# Patient Record
Sex: FEMALE | Race: OTHER | HISPANIC OR LATINO | URBAN - METROPOLITAN AREA
[De-identification: names, ages, dates, MRNs, and addresses within clinical notes are randomized per-mention and may not be internally consistent; named-entity substitution may affect disease eponyms.]

---

## 2018-05-29 ENCOUNTER — INPATIENT (INPATIENT)
Facility: HOSPITAL | Age: 58
LOS: 19 days | Discharge: HOME | DRG: 178 | End: 2018-06-18
Attending: HOSPITALIST | Admitting: INTERNAL MEDICINE
Payer: MEDICAID

## 2018-05-29 VITALS — WEIGHT: 130.07 LBS

## 2018-05-29 DIAGNOSIS — A15.9 RESPIRATORY TUBERCULOSIS UNSPECIFIED: ICD-10-CM

## 2018-05-29 DIAGNOSIS — Z98.890 OTHER SPECIFIED POSTPROCEDURAL STATES: Chronic | ICD-10-CM

## 2018-05-29 PROCEDURE — 71045 X-RAY EXAM CHEST 1 VIEW: CPT | Mod: 26

## 2018-05-29 PROCEDURE — 99285 EMERGENCY DEPT VISIT HI MDM: CPT

## 2018-05-29 PROCEDURE — 99223 1ST HOSP IP/OBS HIGH 75: CPT | Mod: GC

## 2018-05-29 RX ORDER — LORATADINE 10 MG/1
1 TABLET ORAL
Qty: 0 | Refills: 0 | COMMUNITY

## 2018-05-29 RX ORDER — OMEPRAZOLE 10 MG/1
1 CAPSULE, DELAYED RELEASE ORAL
Qty: 0 | Refills: 0 | COMMUNITY

## 2018-05-29 NOTE — ED PROVIDER NOTE - OBJECTIVE STATEMENT
A 57 year old female pt with a hx of HLD presents to the ED for r/o Tuberculosis. The pt is native to [facility] and has been dealing with a nonproductive cough for the past two years. Pt recently came to the [place] and went to the [facility] 1 week ago. She had a CXR and QuantiFeron gold test which came back positive. Pt denies any rceent fevers or weight loss. denies fever. denies HA or neck pain. no chest pain or sob. no abd pain. no n/v/d. no urinary f/u/d. no back pain. no motor or sensory deficits. denies illicit drug use. no recent travel. no rash. no other acute issues symptoms or concerns

## 2018-05-29 NOTE — ED ADULT TRIAGE NOTE - CHIEF COMPLAINT QUOTE
Patient sent from [facility] to R/O TB, patient reports SOB and cough, patient recently arrived from [facility].

## 2018-05-29 NOTE — H&P ADULT - HISTORY OF PRESENT ILLNESS
Pt is 56 yo F with PMH of HLD and previous alcohol abuse for about 30 yrs ( quit 1 yr ago ). 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 Pt denies hx of smoking or second had smoking, but she does report hx of wood use for cooking for about 40 yrs. Recently pt went to [facility] clinic and her QuantiFeron test was positive , they ordered a cxr and report came back showing calcifications and possible granulomas in the RUL. Pt was sent to [facility] for evaluation and tx. Recently she was prescribe a PPI and Claritin but these medications did not help with her cough. Pt denies fever, chest pain , abdominal pain , weight loss, diarrhea, or sick contacts. Pt has been traveling back and forward to [facility]. Three of her family members tested positives with QuantiFeron test and they were already tx for a total of 9 months .

## 2018-05-29 NOTE — H&P ADULT - NSHPPHYSICALEXAM_GEN_ALL_CORE
Vital Signs Last 24 Hrs  T(C): 36.7 (29 May 2018 22:00), Max: 36.7 (29 May 2018 22:00)  T(F): 98.1 (29 May 2018 22:00), Max: 98.1 (29 May 2018 22:00)  HR: 66 (29 May 2018 22:00) (66 - 66)  BP: 144/79 (29 May 2018 22:00) (144/79 - 144/79)  RR: 16 (29 May 2018 22:00) (16 - 16)  SpO2: 96% (29 May 2018 22:00) (96% - 96%) Vital Signs Last 24 Hrs  T(C): 36.7 (29 May 2018 22:00), Max: 36.7 (29 May 2018 22:00)  T(F): 98.1 (29 May 2018 22:00), Max: 98.1 (29 May 2018 22:00)  HR: 66 (29 May 2018 22:00) (66 - 66)  BP: 144/79 (29 May 2018 22:00) (144/79 - 144/79)  RR: 16 (29 May 2018 22:00) (16 - 16)  SpO2: 96% (29 May 2018 22:00) (96% - 96%)    GENERAL: NAD, well-groomed, well-developed  HEAD:  Atraumatic, Normocephalic  EYES: EOMI, PERRLA, conjunctiva and sclera clear  ENMT: No tonsillar erythema, exudates, or enlargement; Moist mucous membranes, No lesions  NECK: Supple, No JVD, Normal thyroid  Respiratory: No rhonchi, wheezing, or rubs. Positive rales over right upper lobe   CV: Regular rate and rhythm; No murmurs, rubs, or gallops  Gastrointestinal: Soft, Nontender, Nondistended; Bowel sounds present  EXTREMITIES:  2+ Peripheral Pulses, No clubbing, cyanosis, or edema  LYMPH: No lymphadenopathy noted  NERVOUS SYSTEM:  Alert & Oriented X3, Good concentration; Motor Strength 5/5 B/L upper and lower extremities; . CN II-XII grossly intact   SKIN: No rashes or lesions

## 2018-05-29 NOTE — H&P ADULT - NSHPSOCIALHISTORY_GEN_ALL_CORE
Pt lives with her daughter and other family members, denies smoking or second hand smoking, no drugs, previous hx of alcohol use for about 30 yrs, she quit 1 yrs ago.

## 2018-05-29 NOTE — H&P ADULT - ASSESSMENT
Pt is 58 yo F with PMH of HLD and previous alcohol abuse for about 30 yrs ( quit 1 yr ago ) , sent from [facility] clinic due to chronic cough associated with sputum production , positive Quantiferon test and hx of TB exposure and prior CXR with calcifications/lesions over Right upper lobe.  Pt will be admitted due to possible active TB.     1-Tuberculosis  -admit to medicine service under Dr. Faustin   -vitals signs per routine , any bed  -Diet: dash  -Airborne precautions   -Repeat CXR  -sputum culture every day   -ID consult     2-HLD  -pt is no taken any medication at this time  -will order lipid panel     3-Preventive measure   Lovenox 40 mg /d Pt is 56 yo F with PMH of HLD and previous alcohol abuse for about 30 yrs ( quit 1 yr ago ) , sent from [facility] clinic due to chronic cough associated with sputum production , positive Quantiferon test and hx of TB exposure and prior CXR with calcifications/lesions over Right upper lobe.  Pt will be admitted due to high risk for  active TB.     1-Tuberculosis  -admit to medicine service under Dr. Faustin   -vitals signs per routine , any bed  -Diet: dash  -Airborne precautions   -Repeat CXR  -sputum culture every day   -ID consult   -GUNJAN aware of pt , will contact them in the am     2-HLD  -pt is no taken any medication at this time  -Lipid panel done last week, no need for med at this time , just life style modifications     3-Preventive measure   -DVT proph: ambulation and VCD boots as pt is low risk

## 2018-05-30 DIAGNOSIS — Z29.9 ENCOUNTER FOR PROPHYLACTIC MEASURES, UNSPECIFIED: ICD-10-CM

## 2018-05-30 DIAGNOSIS — E78.5 HYPERLIPIDEMIA, UNSPECIFIED: ICD-10-CM

## 2018-05-30 DIAGNOSIS — A15.9 RESPIRATORY TUBERCULOSIS UNSPECIFIED: ICD-10-CM

## 2018-05-30 LAB
ALBUMIN SERPL ELPH-MCNC: 4.4 G/DL — SIGNIFICANT CHANGE UP (ref 3.3–5.2)
ALP SERPL-CCNC: 125 U/L — HIGH (ref 40–120)
ALT FLD-CCNC: 17 U/L — SIGNIFICANT CHANGE UP
ANION GAP SERPL CALC-SCNC: 14 MMOL/L — SIGNIFICANT CHANGE UP (ref 5–17)
AST SERPL-CCNC: 18 U/L — SIGNIFICANT CHANGE UP
BASOPHILS # BLD AUTO: 0 K/UL — SIGNIFICANT CHANGE UP (ref 0–0.2)
BASOPHILS NFR BLD AUTO: 0.1 % — SIGNIFICANT CHANGE UP (ref 0–2)
BILIRUB SERPL-MCNC: 0.4 MG/DL — SIGNIFICANT CHANGE UP (ref 0.4–2)
BUN SERPL-MCNC: 18 MG/DL — SIGNIFICANT CHANGE UP (ref 8–20)
CALCIUM SERPL-MCNC: 9.2 MG/DL — SIGNIFICANT CHANGE UP (ref 8.6–10.2)
CHLORIDE SERPL-SCNC: 99 MMOL/L — SIGNIFICANT CHANGE UP (ref 98–107)
CO2 SERPL-SCNC: 25 MMOL/L — SIGNIFICANT CHANGE UP (ref 22–29)
CREAT SERPL-MCNC: 0.63 MG/DL — SIGNIFICANT CHANGE UP (ref 0.5–1.3)
EOSINOPHIL # BLD AUTO: 0 K/UL — SIGNIFICANT CHANGE UP (ref 0–0.5)
EOSINOPHIL NFR BLD AUTO: 0.6 % — SIGNIFICANT CHANGE UP (ref 0–6)
GLUCOSE SERPL-MCNC: 117 MG/DL — HIGH (ref 70–115)
HCT VFR BLD CALC: 40.6 % — SIGNIFICANT CHANGE UP (ref 37–47)
HGB BLD-MCNC: 12.8 G/DL — SIGNIFICANT CHANGE UP (ref 12–16)
LYMPHOCYTES # BLD AUTO: 1.7 K/UL — SIGNIFICANT CHANGE UP (ref 1–4.8)
LYMPHOCYTES # BLD AUTO: 20.9 % — SIGNIFICANT CHANGE UP (ref 20–55)
MCHC RBC-ENTMCNC: 28.3 PG — SIGNIFICANT CHANGE UP (ref 27–31)
MCHC RBC-ENTMCNC: 31.5 G/DL — LOW (ref 32–36)
MCV RBC AUTO: 89.6 FL — SIGNIFICANT CHANGE UP (ref 81–99)
MONOCYTES # BLD AUTO: 0.6 K/UL — SIGNIFICANT CHANGE UP (ref 0–0.8)
MONOCYTES NFR BLD AUTO: 6.8 % — SIGNIFICANT CHANGE UP (ref 3–10)
NEUTROPHILS # BLD AUTO: 5.6 K/UL — SIGNIFICANT CHANGE UP (ref 1.8–8)
NEUTROPHILS NFR BLD AUTO: 69.6 % — SIGNIFICANT CHANGE UP (ref 37–73)
PLATELET # BLD AUTO: 259 K/UL — SIGNIFICANT CHANGE UP (ref 150–400)
POTASSIUM SERPL-MCNC: 4.3 MMOL/L — SIGNIFICANT CHANGE UP (ref 3.5–5.3)
POTASSIUM SERPL-SCNC: 4.3 MMOL/L — SIGNIFICANT CHANGE UP (ref 3.5–5.3)
PROT SERPL-MCNC: 7.9 G/DL — SIGNIFICANT CHANGE UP (ref 6.6–8.7)
RBC # BLD: 4.53 M/UL — SIGNIFICANT CHANGE UP (ref 4.4–5.2)
RBC # FLD: 14.4 % — SIGNIFICANT CHANGE UP (ref 11–15.6)
SODIUM SERPL-SCNC: 138 MMOL/L — SIGNIFICANT CHANGE UP (ref 135–145)
WBC # BLD: 8.1 K/UL — SIGNIFICANT CHANGE UP (ref 4.8–10.8)
WBC # FLD AUTO: 8.1 K/UL — SIGNIFICANT CHANGE UP (ref 4.8–10.8)

## 2018-05-30 PROCEDURE — 99223 1ST HOSP IP/OBS HIGH 75: CPT

## 2018-05-30 PROCEDURE — 71250 CT THORAX DX C-: CPT | Mod: 26

## 2018-05-30 PROCEDURE — 99233 SBSQ HOSP IP/OBS HIGH 50: CPT | Mod: GC

## 2018-05-30 RX ORDER — ACETAMINOPHEN 500 MG
650 TABLET ORAL EVERY 6 HOURS
Qty: 0 | Refills: 0 | Status: DISCONTINUED | OUTPATIENT
Start: 2018-05-30 | End: 2018-06-18

## 2018-05-30 RX ORDER — ENOXAPARIN SODIUM 100 MG/ML
40 INJECTION SUBCUTANEOUS DAILY
Qty: 0 | Refills: 0 | Status: DISCONTINUED | OUTPATIENT
Start: 2018-05-30 | End: 2018-06-04

## 2018-05-30 RX ORDER — FLUTICASONE PROPIONATE 50 MCG
1 SPRAY, SUSPENSION NASAL
Qty: 0 | Refills: 0 | Status: DISCONTINUED | OUTPATIENT
Start: 2018-05-30 | End: 2018-06-18

## 2018-05-30 RX ORDER — ENOXAPARIN SODIUM 100 MG/ML
40 INJECTION SUBCUTANEOUS DAILY
Qty: 0 | Refills: 0 | Status: DISCONTINUED | OUTPATIENT
Start: 2018-05-30 | End: 2018-05-30

## 2018-05-30 RX ADMIN — Medication 1 SPRAY(S): at 17:47

## 2018-05-30 RX ADMIN — ENOXAPARIN SODIUM 40 MILLIGRAM(S): 100 INJECTION SUBCUTANEOUS at 14:18

## 2018-05-30 NOTE — CONSULT NOTE ADULT - ASSESSMENT
58 y/o woman with PMH of HLD and alcohol abuse in the past was admitted with positive QuantiFeron test, cough and sputum for about 2 years with some weight loss and night sweats.   She had contact with possible TB cases about 15-20 years ago.   TB is a possibility but sometimes old infection causing granulomas/scars in CXR is also possibility.     1-R/O TB:  -Airborne isolation  -CXR and chest CT  -Sputum AFB smear and culture x 3   -HIV test  -No empiric antibiotics or IRPE at this time.   -Based on CT and labs report will decide about need for bronchoscopy   -Meds for allergic rhinitis, possibly part of her symptoms  is related to allergy     Will follow up

## 2018-05-30 NOTE — PROGRESS NOTE ADULT - ATTENDING COMMENTS
Patient seen and examined at the bedside. Agree with the above history, physical, assessment, and plan with the necessary amendments/elaborations below:    clinically stable. does have report of many symptoms concerning for TB including night sweats, persistent productive cough. in conjunction with abn imaging + testing for TB, high suspicion for disease. sp collection of sputum x2 samples, pending 3rd. per id no meds until results confirming dx. cont iso.     also with complaints of HA, sinus pressure, nasal congestion, chronic, appearing allergic in nature. claritin, flonase, naproxen prn for symptoms. will follow.

## 2018-05-30 NOTE — PROGRESS NOTE ADULT - SUBJECTIVE AND OBJECTIVE BOX
Patient is a 57y old  Female who presents with a chief complaint of cough, positive QuantiFeron test and abnormal cxr (29 May 2018 22:08)    Patient seen and examined at bedside, No acute overnight events. Pt still report episodes of cough associated with sputum production   Denies fever, chest pain, SOB, abdominal pain, diarrhea, constipation, calf pain.  Patient ambulating, eating well, voiding and last BM yesterday       VS:   Vital Signs Last 24 Hrs  T(C): 36.7 (30 May 2018 05:55), Max: 36.7 (29 May 2018 22:00)  T(F): 98 (30 May 2018 05:55), Max: 98.1 (29 May 2018 22:00)  HR: 57 (30 May 2018 05:55) (57 - 66)  BP: 132/74 (30 May 2018 05:55) (114/72 - 144/79)  RR: 18 (30 May 2018 05:55) (16 - 18)  SpO2: 96% (30 May 2018 05:55) (94% - 97%)    Physical Exam:   GENERAL: NAD, well-groomed, well-developed  	HEAD:  Atraumatic, Normocephalic  	EYES: EOMI, PERRLA, conjunctiva and sclera clear  	ENMT: No tonsillar erythema, exudates, or enlargement; Moist mucous membranes, No lesions  	NECK: Supple, No JVD, Normal thyroid  	Respiratory: No rhonchi, wheezing, or rubs. Positive rales over right upper lobe   	CV: Regular rate and rhythm; No murmurs, rubs, or gallops  	Gastrointestinal: Soft, Nontender, Nondistended; Bowel sounds present  	EXTREMITIES:  2+ Peripheral Pulses, No clubbing, cyanosis, or edema  	LYMPH: No lymphadenopathy noted  	NERVOUS SYSTEM:  Alert & Oriented X3, Good concentration; Motor Strength 5/5 B/L upper and lower extremities; . CN II-XII grossly intact   SKIN: No rashes or lesions            Labs:                        12.8   8.1   )-----------( 259      ( 30 May 2018 00:45 )             40.6   05-30    138  |  99  |  18.0  ----------------------------<  117<H>  4.3   |  25.0  |  0.63    Ca    9.2      30 May 2018 00:45    TPro  7.9  /  Alb  4.4  /  TBili  0.4  /  DBili  x   /  AST  18  /  ALT  17  /  AlkPhos  125<H>  05-30        Radiology:  *Pull    Medications:  MEDICATIONS  (STANDING):  enoxaparin Injectable 40 milliGRAM(s) SubCutaneous daily    MEDICATIONS  (PRN):  acetaminophen   Tablet. 650 milliGRAM(s) Oral every 6 hours PRN Mild Pain (1 - 3)

## 2018-05-30 NOTE — CHART NOTE - NSCHARTNOTEFT_GEN_A_CORE
Spoke to Michela Dooley from [facility] ([phone]) regarding frequency of AFB smears. Patient is required to have 3 smear 8 hours apart, with one being an early morning specimen. Labs ordered appropriately.

## 2018-05-30 NOTE — ED ADULT NURSE NOTE - OBJECTIVE STATEMENT
As per pt family "shes had a cough for 1 year w/ SOB while walking, she came here from [facility] and went to [facility] they took blood and did a chest xray and said she might have tuberculosis, hx of high cholesterol and was a drinker stopped 1 year ago", pt AOx3, denies pain at this time, family @ bedside for translation, pt ambulates safely and steadily w/out assistance, denies sick contacts

## 2018-05-30 NOTE — PROGRESS NOTE ADULT - ASSESSMENT
Pt is 56 yo F with PMH of HLD and previous alcohol abuse for about 30 yrs ( quit 1 yr ago ) , sent from [facility] clinic due to chronic cough associated with sputum production , positive Quantiferon test and hx of TB exposure and prior CXR with calcifications/lesions over Right upper lobe.  Pt will be admitted due to high risk for  active TB.     1- Rule out Tuberculosis  -Airborne precautions   -Repeat CXR  -sputum culture every day   -ID consult   -GUNJAN aware of pt , will contact them in the am     2-HLD  -pt is no taken any medication at this time  -Lipid panel done last week, no need for med at this time , just life style modifications     3-Preventive measure   -DVT proph: ambulation and VCD boots as pt is low risk

## 2018-05-30 NOTE — ED ADULT NURSE REASSESSMENT NOTE - NS ED NURSE REASSESS COMMENT FT1
Report given to receiving RN Kay, awaiting transport to floor, family @ bedside, pt aware of plan of care. Report given to receiving RN Kay, awaiting transport to floor, family @ bedside, mask placed on pt, pt aware of plan of care.

## 2018-05-30 NOTE — ED ADULT NURSE NOTE - CHIEF COMPLAINT QUOTE
Patient sent from [facility] to R/O TB, patient reports SOB and cough, patient recently arrived from [facility].

## 2018-05-30 NOTE — CONSULT NOTE ADULT - SUBJECTIVE AND OBJECTIVE BOX
[facility]  INFECTIOUS DISEASES AND INTERNAL MEDICINE at [place]  =======================================================  Ha Callahan MD  Diplomates American Board of Internal Medicine and Infectious Diseases  =======================================================    [id number]  ERIC ZION     CC: cough, positive QuantiFeron test and abnormal chest Xray.     HPI:  Patient is a Stateless speaking woman, the daughter was in the room so she helped with interpretation( as per patient's request).     56 y/o woman with PMH of HLD and alcohol abuse in the past was referred by [facility] clinic for possible TB infection. She has cough with yellow/green sputum, night sweats and weight loss in last 2 years.    No new changes in her symptoms. Appetite is good, no fever.   She lives in [place] and vising her kids in [place] on [place] and planning to go back in 1-2 months. Lives with her  in a city if 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 Never had PPD test in the past.   She also has allergic rhinitis mostly to pollens.  Three of her family members tested positives with QuantiFeron test and they were already treated for 9motnhs.     PAST MEDICAL & SURGICAL HISTORY:  Hyperlipidemia  Alcohol abuse stopped one year ago  S/P hernia repair      Social Hx: No smoking or drugs but abused alcohol for more than 20 years and stopped one year ago.     FAMILY HISTORY:  No pertinent family history in first degree relatives      Allergies  No Known Allergies    Antibiotics:  None      REVIEW OF SYSTEMS:  CONSTITUTIONAL:  No Fever or chills, + weight loss   HEENT:  No diplopia or blurred vision.  No earache, sore throat, + Runny nose  CARDIOVASCULAR:  No chest pain or SOB.  RESPIRATORY:  + cough, No SOB, +sputum   GI:  No nausea, vomiting or diarrhea.  GENITOURINARY:  No dysuria, frequency or urgency. No Blood in urine  MUSCULOSKELETAL:  no joint aches, no muscle pain  SKIN:  No change in skin, hair or nails.  NEUROLOGIC:  No paresthesias, fasciculations, seizures or weakness.  PSYCHIATRIC:  No disorder of thought or mood.  ENDOCRINE:  No heat or cold intolerance, polyuria or polydipsia.  HEMATOLOGICAL:  No easy bruising or bleeding.     Physical Exam:  Vital Signs Last 24 Hrs  T(C): 36.7 (30 May 2018 05:55), Max: 36.7 (29 May 2018 22:00)  T(F): 98 (30 May 2018 05:55), Max: 98.1 (29 May 2018 22:00)  HR: 57 (30 May 2018 05:55) (57 - 66)  BP: 132/74 (30 May 2018 05:55) (114/72 - 144/79)  RR: 18 (30 May 2018 05:55) (16 - 18)  SpO2: 96% (30 May 2018 05:55) (94% - 97%)  Weight (kg): 59 (05-29 @ 19:59)  GEN: NAD  HEENT: normocephalic and atraumatic. EOMI. PERRL.    NECK: Supple.  No lymphadenopathy   LUNGS: scattered coarse rhonchi bilaterally  HEART: Regular rate and rhythm without murmur.  ABDOMEN: Soft, nontender, and nondistended.  Positive bowel sounds.    : No CVA tenderness  EXTREMITIES: Without any cyanosis, clubbing, rash, lesions or edema.   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  PSYCHIATRIC: Appropriate affect .  SKIN: No ulceration or induration present.    Labs:  05-30    138  |  99  |  18.0  ----------------------------<  117<H>  4.3   |  25.0  |  0.63    Ca    9.2      30 May 2018 00:45    TPro  7.9  /  Alb  4.4  /  TBili  0.4  /  DBili  x   /  AST  18  /  ALT  17  /  AlkPhos  125<H>  05-30                        12.8   8.1   )-----------( 259      ( 30 May 2018 00:45 )             40.6     LIVER FUNCTIONS - ( 30 May 2018 00:45 )  Alb: 4.4 g/dL / Pro: 7.9 g/dL / ALK PHOS: 125 U/L / ALT: 17 U/L / AST: 18 U/L / GGT: x           RECENT CULTURES:  Pending     All imaging and other data have been reviewed.  Pending

## 2018-05-31 LAB
ALBUMIN SERPL ELPH-MCNC: 4 G/DL — SIGNIFICANT CHANGE UP (ref 3.3–5.2)
ALP SERPL-CCNC: 118 U/L — SIGNIFICANT CHANGE UP (ref 40–120)
ALT FLD-CCNC: 16 U/L — SIGNIFICANT CHANGE UP
ANION GAP SERPL CALC-SCNC: 12 MMOL/L — SIGNIFICANT CHANGE UP (ref 5–17)
AST SERPL-CCNC: 15 U/L — SIGNIFICANT CHANGE UP
BASOPHILS # BLD AUTO: 0 K/UL — SIGNIFICANT CHANGE UP (ref 0–0.2)
BASOPHILS NFR BLD AUTO: 0.5 % — SIGNIFICANT CHANGE UP (ref 0–2)
BILIRUB SERPL-MCNC: 0.7 MG/DL — SIGNIFICANT CHANGE UP (ref 0.4–2)
BUN SERPL-MCNC: 24 MG/DL — HIGH (ref 8–20)
CALCIUM SERPL-MCNC: 9.3 MG/DL — SIGNIFICANT CHANGE UP (ref 8.6–10.2)
CHLORIDE SERPL-SCNC: 100 MMOL/L — SIGNIFICANT CHANGE UP (ref 98–107)
CO2 SERPL-SCNC: 28 MMOL/L — SIGNIFICANT CHANGE UP (ref 22–29)
CREAT SERPL-MCNC: 0.58 MG/DL — SIGNIFICANT CHANGE UP (ref 0.5–1.3)
EOSINOPHIL # BLD AUTO: 0.8 K/UL — HIGH (ref 0–0.5)
EOSINOPHIL NFR BLD AUTO: 13.2 % — HIGH (ref 0–6)
GLUCOSE SERPL-MCNC: 88 MG/DL — SIGNIFICANT CHANGE UP (ref 70–115)
HCT VFR BLD CALC: 40.4 % — SIGNIFICANT CHANGE UP (ref 37–47)
HGB BLD-MCNC: 12.5 G/DL — SIGNIFICANT CHANGE UP (ref 12–16)
HIV 1 & 2 AB SERPL IA.RAPID: SIGNIFICANT CHANGE UP
LYMPHOCYTES # BLD AUTO: 1.5 K/UL — SIGNIFICANT CHANGE UP (ref 1–4.8)
LYMPHOCYTES # BLD AUTO: 24.4 % — SIGNIFICANT CHANGE UP (ref 20–55)
MCHC RBC-ENTMCNC: 27.9 PG — SIGNIFICANT CHANGE UP (ref 27–31)
MCHC RBC-ENTMCNC: 30.9 G/DL — LOW (ref 32–36)
MCV RBC AUTO: 90.2 FL — SIGNIFICANT CHANGE UP (ref 81–99)
MONOCYTES # BLD AUTO: 0.6 K/UL — SIGNIFICANT CHANGE UP (ref 0–0.8)
MONOCYTES NFR BLD AUTO: 9.6 % — SIGNIFICANT CHANGE UP (ref 3–10)
NEUTROPHILS # BLD AUTO: 3.1 K/UL — SIGNIFICANT CHANGE UP (ref 1.8–8)
NEUTROPHILS NFR BLD AUTO: 50.5 % — SIGNIFICANT CHANGE UP (ref 37–73)
NIGHT BLUE STAIN TISS: SIGNIFICANT CHANGE UP
PLATELET # BLD AUTO: 227 K/UL — SIGNIFICANT CHANGE UP (ref 150–400)
POTASSIUM SERPL-MCNC: 4.2 MMOL/L — SIGNIFICANT CHANGE UP (ref 3.5–5.3)
POTASSIUM SERPL-SCNC: 4.2 MMOL/L — SIGNIFICANT CHANGE UP (ref 3.5–5.3)
PROT SERPL-MCNC: 7.3 G/DL — SIGNIFICANT CHANGE UP (ref 6.6–8.7)
RBC # BLD: 4.48 M/UL — SIGNIFICANT CHANGE UP (ref 4.4–5.2)
RBC # FLD: 14.5 % — SIGNIFICANT CHANGE UP (ref 11–15.6)
SODIUM SERPL-SCNC: 140 MMOL/L — SIGNIFICANT CHANGE UP (ref 135–145)
SPECIMEN SOURCE: SIGNIFICANT CHANGE UP
T PALLIDUM AB TITR SER: NEGATIVE — SIGNIFICANT CHANGE UP
WBC # BLD: 6.2 K/UL — SIGNIFICANT CHANGE UP (ref 4.8–10.8)
WBC # FLD AUTO: 6.2 K/UL — SIGNIFICANT CHANGE UP (ref 4.8–10.8)

## 2018-05-31 PROCEDURE — 99232 SBSQ HOSP IP/OBS MODERATE 35: CPT

## 2018-05-31 PROCEDURE — 99233 SBSQ HOSP IP/OBS HIGH 50: CPT | Mod: GC

## 2018-05-31 RX ORDER — ACETAMINOPHEN 500 MG
650 TABLET ORAL EVERY 6 HOURS
Qty: 0 | Refills: 0 | Status: DISCONTINUED | OUTPATIENT
Start: 2018-05-31 | End: 2018-05-31

## 2018-05-31 RX ADMIN — Medication 250 MILLIGRAM(S): at 09:13

## 2018-05-31 RX ADMIN — ENOXAPARIN SODIUM 40 MILLIGRAM(S): 100 INJECTION SUBCUTANEOUS at 13:04

## 2018-05-31 RX ADMIN — Medication 250 MILLIGRAM(S): at 10:15

## 2018-05-31 RX ADMIN — Medication 1 SPRAY(S): at 05:29

## 2018-05-31 RX ADMIN — Medication 1 SPRAY(S): at 18:07

## 2018-05-31 NOTE — PROGRESS NOTE ADULT - ASSESSMENT
Pt is 56 yo F with PMH of HLD and previous alcohol abuse for about 30 yrs ( quit 1 yr ago ) , sent from [facility] clinic due to chronic cough associated with sputum production , positive Quantiferon test and hx of TB exposure and prior CXR with calcifications/lesions over Right upper lobe.  Pt will be admitted due to high risk for  active TB.     1- Rule out Tuberculosis  -Airborne precautions   -CT: 1.  Multifocal scarring and multiple small calcifications in the right   lung signify prior granulomatous disease. No cavitation or consolidation.  2.  Tree in bud opacities in the periphery of the right lower lobe can be   seen   with endobronchial spread of tuberculosis;   -sputum culture x 3 collected  , waiting for result   -ID consulted noted   -GUNJAN aware of pt ,    2-HLD  -pt is no taken any medication at this time  -Lipid panel done last week, no need for med at this time , just life style modifications     3-Preventive measure   -DVT proph: Lovenox 40 mg daily

## 2018-05-31 NOTE — PROGRESS NOTE ADULT - ASSESSMENT
58 y/o woman with PMH of HLD and alcohol abuse in the past was admitted with positive QuantiFeron test, cough and sputum for about 2 years with some weight loss and night sweats.   She had contact with possible TB cases about 15-20 years ago.   TB is a possibility but sometimes old infection causing granulomas/scars as well.   HIV neg. CT suggestive of scars of old infection.     1-R/O TB:  -Airborne isolation  -Sputum AFB smear and culture x 3   -No empiric antibiotics or IRPE at this time.   -If 3 neg AFB sputa, recommend bronchoscopy and BAL to be sent for AFB smear, culture and PCR. Can start IRPE after bronch while awaiting the results.

## 2018-05-31 NOTE — PROGRESS NOTE ADULT - SUBJECTIVE AND OBJECTIVE BOX
[facility]  INFECTIOUS DISEASES AND INTERNAL MEDICINE at [place]  =======================================================  Ha Callahan MD  Diplomates American Board of Internal Medicine and Infectious Diseases  =======================================================    ERIC DONOVAN [id number]    Follow up:  56 y/o woman with PMH of HLD and alcohol abuse in the past was referred by [facility] clinic for possible TB infection. She has cough with yellow/green sputum, night sweats and weight loss in last 2 years.    She lives in [place] and vising her kids in [place]. [place] to [place] on [place]4 and planning to go back in 1-2 months. 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 Never had PPD test in the past.   Three of her family members tested positives with QuantiFeron test and they were already treated for 9motnhs.   No changes in her symptoms.     PAST MEDICAL & SURGICAL HISTORY:  Hyperlipidemia  Alcohol abuse stopped one year ago  S/P hernia repair    Social Hx: No smoking or drugs but abused alcohol for more than 20 years and stopped one year ago.     FAMILY HISTORY:  No pertinent family history in first degree relatives    Allergies  No Known Allergies    Antibiotics:  None      REVIEW OF SYSTEMS:  CONSTITUTIONAL:  No Fever or chills, + weight loss   HEENT:  No diplopia or blurred vision.  No earache, sore throat, + Runny nose  CARDIOVASCULAR:  No chest pain or SOB.  RESPIRATORY:  + cough, No SOB, +sputum   GI:  No nausea, vomiting or diarrhea.  GENITOURINARY:  No dysuria, frequency or urgency. No Blood in urine  MUSCULOSKELETAL:  no joint aches, no muscle pain  SKIN:  No change in skin, hair or nails.  NEUROLOGIC:  No paresthesias, fasciculations, seizures or weakness.  PSYCHIATRIC:  No disorder of thought or mood.  ENDOCRINE:  No heat or cold intolerance, polyuria or polydipsia.  HEMATOLOGICAL:  No easy bruising or bleeding.     Physical Exam:  Vital Signs Last 24 Hrs  T(C): 37.1 (31 May 2018 05:12), Max: 37.1 (31 May 2018 05:12)  T(F): 98.8 (31 May 2018 05:12), Max: 98.8 (31 May 2018 05:12)  HR: 70 (31 May 2018 05:12) (68 - 70)  BP: 121/76 (31 May 2018 05:12) (110/73 - 121/76)  RR: 18 (31 May 2018 05:12) (18 - 18)  GEN: NAD  HEENT: normocephalic and atraumatic. EOMI. PERRL.    NECK: Supple.  No lymphadenopathy   LUNGS: scattered coarse rhonchi bilaterally  HEART: Regular rate and rhythm without murmur.  ABDOMEN: Soft, nontender, and nondistended.  Positive bowel sounds.    : No CVA tenderness  EXTREMITIES: Without any cyanosis, clubbing, rash, lesions or edema.   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  PSYCHIATRIC: Appropriate affect .  SKIN: No ulceration or induration present.    Labs:  05-31    140  |  100  |  24.0<H>  ----------------------------<  88  4.2   |  28.0  |  0.58    Ca    9.3      31 May 2018 06:36    TPro  7.3  /  Alb  4.0  /  TBili  0.7  /  DBili  x   /  AST  15  /  ALT  16  /  AlkPhos  118  05-31                    12.5   6.2   )-----------( 227      ( 31 May 2018 06:36 )             40.4     LIVER FUNCTIONS - ( 31 May 2018 06:36 )  Alb: 4.0 g/dL / Pro: 7.3 g/dL / ALK PHOS: 118 U/L / ALT: 16 U/L / AST: 15 U/L / GGT: x           RECENT CULTURES:  Pending    All imaging and data are reviewed.     Chest CT:  IMPRESSION:       1.  Multifocal scarring and multiple small calcifications in the right lung   signify prior granulomatous disease. No cavitation or consolidation.  2.  Tree in bud opacities in the periphery of the right lower lobe can be   seen with endobronchial spread of tuberculosis; however, there no cavitary   lesions to more definitively indicate the presence of reactivation tuberculosis.   More commonly, tree and opacities will represent viral or bacterial infectious   bronchiolitis.

## 2018-05-31 NOTE — PROGRESS NOTE ADULT - ATTENDING COMMENTS
Patient seen and examined at the bedside. Agree with the above history, physical, assessment, and plan with the necessary amendments/elaborations below:    asymptomatic. afb pending. doing well. diffuse rhonchi noted, not likely tb related but believe pt to have underlying resp path, highest likelihood for asthma, allergic variant, given other symptoms congruent with allergies. no hx smoking, + occ 2nd hand smoke. cont supportive care. add duonebs prn as pt recieves much benefit from it on arrival. otherwise cont tb work up, id consult appreciated. needs pulm follow up on dc for spirometry/pfts despite outcome of this work up

## 2018-05-31 NOTE — PROGRESS NOTE ADULT - SUBJECTIVE AND OBJECTIVE BOX
Patient is a 57y old  Female who presents with a chief complaint of cough, positive QuantiFeron test and abnormal cxr (29 May 2018 22:08)    Patient seen and examined at bedside, No acute overnight events. Pt still report episodes of cough associated with sputum production   Denies fever, chest pain, SOB, abdominal pain, diarrhea, constipation, calf pain.  Patient ambulating, eating well, voiding and last BM yesterday       Vital Signs Last 24 Hrs  T(C): 37.1 (31 May 2018 05:12), Max: 37.1 (31 May 2018 05:12)  T(F): 98.8 (31 May 2018 05:12), Max: 98.8 (31 May 2018 05:12)  HR: 70 (31 May 2018 05:12) (68 - 70)  BP: 121/76 (31 May 2018 05:12) (110/73 - 121/76)  RR: 18 (31 May 2018 05:12) (18 - 18)      Physical Exam:   GENERAL: NAD, well-groomed, well-developed  	HEAD:  Atraumatic, Normocephalic  	EYES: EOMI, PERRLA, conjunctiva and sclera clear  	ENMT: No tonsillar erythema, exudates, or enlargement; Moist mucous membranes, No lesions  	NECK: Supple, No JVD, Normal thyroid  	Respiratory: No rhonchi, wheezing, or rubs. Positive rales over right upper lobe   	CV: Regular rate and rhythm; No murmurs, rubs, or gallops  	Gastrointestinal: Soft, Nontender, Nondistended; Bowel sounds present  	EXTREMITIES:  2+ Peripheral Pulses, No clubbing, cyanosis, or edema  	LYMPH: No lymphadenopathy noted  	NERVOUS SYSTEM:  Alert & Oriented X3, Good concentration; Motor Strength 5/5 B/L upper and lower extremities; . CN II-XII grossly intact   SKIN: No rashes or lesions            Labs:                        12.8   8.1   )-----------( 259      ( 30 May 2018 00:45 )             40.6   05-30    138  |  99  |  18.0  ----------------------------<  117<H>  4.3   |  25.0  |  0.63    Ca    9.2      30 May 2018 00:45    TPro  7.9  /  Alb  4.4  /  TBili  0.4  /  DBili  x   /  AST  18  /  ALT  17  /  AlkPhos  125<H>  05-30        Radiology:  < from: CT Chest No Cont (05.30.18 @ 20:04) >  IMPRESSION:       1.  Multifocal scarring and multiple small calcifications in the right   lung   signify prior granulomatous disease. No cavitation or consolidation.  2.  Tree in bud opacities in the periphery of the right lower lobe can be   seen   with endobronchial spread of tuberculosis; however, there no cavitary   lesions   to more definitively indicate the presence of reactivation tuberculosis.   More   commonly, tree and opacities will represent viral or bacterial infectious   bronchiolitis.    < end of copied text >      Medications:  MEDICATIONS  (STANDING):  enoxaparin Injectable 40 milliGRAM(s) SubCutaneous daily    MEDICATIONS  (PRN):  acetaminophen   Tablet. 650 milliGRAM(s) Oral every 6 hours PRN Mild Pain (1 - 3) Patient is a 57y old  Female who presents with a chief complaint of cough, positive QuantiFeron test and abnormal cxr (29 May 2018 22:08)    Patient seen and examined at bedside, No acute overnight events. Pt states that her cough is better , today she report some body aches   Denies fever, chest pain, SOB, abdominal pain, diarrhea, constipation, calf pain.  Patient ambulating, eating well, voiding and last BM yesterday       Vital Signs Last 24 Hrs  T(C): 37.1 (31 May 2018 05:12), Max: 37.1 (31 May 2018 05:12)  T(F): 98.8 (31 May 2018 05:12), Max: 98.8 (31 May 2018 05:12)  HR: 70 (31 May 2018 05:12) (68 - 70)  BP: 121/76 (31 May 2018 05:12) (110/73 - 121/76)  RR: 18 (31 May 2018 05:12) (18 - 18)      Physical Exam:   GENERAL: NAD, well-groomed, well-developed  	HEAD:  Atraumatic, Normocephalic  	EYES: EOMI, PERRLA, conjunctiva and sclera clear  	ENMT: No tonsillar erythema, exudates, or enlargement; Moist mucous membranes, No lesions  	NECK: Supple, No JVD, Normal thyroid  	Respiratory: No rhonchi, wheezing, or rubs. Positive rales over right upper lobe   	CV: Regular rate and rhythm; No murmurs, rubs, or gallops  	Gastrointestinal: Soft, Nontender, Nondistended; Bowel sounds present  	EXTREMITIES:  2+ Peripheral Pulses, No clubbing, cyanosis, or edema  	LYMPH: No lymphadenopathy noted  	NERVOUS SYSTEM:  Alert & Oriented X3, Good concentration; Motor Strength 5/5 B/L upper and lower extremities; . CN II-XII grossly intact   SKIN: No rashes or lesions            Labs:                        12.8   8.1   )-----------( 259      ( 30 May 2018 00:45 )             40.6   05-30    138  |  99  |  18.0  ----------------------------<  117<H>  4.3   |  25.0  |  0.63    Ca    9.2      30 May 2018 00:45    TPro  7.9  /  Alb  4.4  /  TBili  0.4  /  DBili  x   /  AST  18  /  ALT  17  /  AlkPhos  125<H>  05-30        Radiology:  < from: CT Chest No Cont (05.30.18 @ 20:04) >  IMPRESSION:       1.  Multifocal scarring and multiple small calcifications in the right   lung   signify prior granulomatous disease. No cavitation or consolidation.  2.  Tree in bud opacities in the periphery of the right lower lobe can be   seen   with endobronchial spread of tuberculosis; however, there no cavitary   lesions   to more definitively indicate the presence of reactivation tuberculosis.   More   commonly, tree and opacities will represent viral or bacterial infectious   bronchiolitis.    < end of copied text >      Medications:  MEDICATIONS  (STANDING):  enoxaparin Injectable 40 milliGRAM(s) SubCutaneous daily    MEDICATIONS  (PRN):  acetaminophen   Tablet. 650 milliGRAM(s) Oral every 6 hours PRN Mild Pain (1 - 3)

## 2018-06-01 LAB
ANION GAP SERPL CALC-SCNC: 11 MMOL/L — SIGNIFICANT CHANGE UP (ref 5–17)
BASOPHILS # BLD AUTO: 0 K/UL — SIGNIFICANT CHANGE UP (ref 0–0.2)
BASOPHILS NFR BLD AUTO: 0.6 % — SIGNIFICANT CHANGE UP (ref 0–2)
BUN SERPL-MCNC: 22 MG/DL — HIGH (ref 8–20)
CALCIUM SERPL-MCNC: 9.4 MG/DL — SIGNIFICANT CHANGE UP (ref 8.6–10.2)
CHLORIDE SERPL-SCNC: 100 MMOL/L — SIGNIFICANT CHANGE UP (ref 98–107)
CO2 SERPL-SCNC: 30 MMOL/L — HIGH (ref 22–29)
CREAT SERPL-MCNC: 0.63 MG/DL — SIGNIFICANT CHANGE UP (ref 0.5–1.3)
EOSINOPHIL # BLD AUTO: 0.9 K/UL — HIGH (ref 0–0.5)
EOSINOPHIL NFR BLD AUTO: 16.5 % — HIGH (ref 0–6)
GLUCOSE SERPL-MCNC: 87 MG/DL — SIGNIFICANT CHANGE UP (ref 70–115)
HCT VFR BLD CALC: 42.3 % — SIGNIFICANT CHANGE UP (ref 37–47)
HGB BLD-MCNC: 13.3 G/DL — SIGNIFICANT CHANGE UP (ref 12–16)
LYMPHOCYTES # BLD AUTO: 2 K/UL — SIGNIFICANT CHANGE UP (ref 1–4.8)
LYMPHOCYTES # BLD AUTO: 36.5 % — SIGNIFICANT CHANGE UP (ref 20–55)
MCHC RBC-ENTMCNC: 28.2 PG — SIGNIFICANT CHANGE UP (ref 27–31)
MCHC RBC-ENTMCNC: 31.4 G/DL — LOW (ref 32–36)
MCV RBC AUTO: 89.8 FL — SIGNIFICANT CHANGE UP (ref 81–99)
MONOCYTES # BLD AUTO: 0.4 K/UL — SIGNIFICANT CHANGE UP (ref 0–0.8)
MONOCYTES NFR BLD AUTO: 7.5 % — SIGNIFICANT CHANGE UP (ref 3–10)
NEUTROPHILS # BLD AUTO: 2 K/UL — SIGNIFICANT CHANGE UP (ref 1.8–8)
NEUTROPHILS NFR BLD AUTO: 37.6 % — SIGNIFICANT CHANGE UP (ref 37–73)
PLATELET # BLD AUTO: 230 K/UL — SIGNIFICANT CHANGE UP (ref 150–400)
POTASSIUM SERPL-MCNC: 4.5 MMOL/L — SIGNIFICANT CHANGE UP (ref 3.5–5.3)
POTASSIUM SERPL-SCNC: 4.5 MMOL/L — SIGNIFICANT CHANGE UP (ref 3.5–5.3)
RBC # BLD: 4.71 M/UL — SIGNIFICANT CHANGE UP (ref 4.4–5.2)
RBC # FLD: 14.3 % — SIGNIFICANT CHANGE UP (ref 11–15.6)
SODIUM SERPL-SCNC: 141 MMOL/L — SIGNIFICANT CHANGE UP (ref 135–145)
WBC # BLD: 5.4 K/UL — SIGNIFICANT CHANGE UP (ref 4.8–10.8)
WBC # FLD AUTO: 5.4 K/UL — SIGNIFICANT CHANGE UP (ref 4.8–10.8)

## 2018-06-01 PROCEDURE — 99221 1ST HOSP IP/OBS SF/LOW 40: CPT

## 2018-06-01 PROCEDURE — 99233 SBSQ HOSP IP/OBS HIGH 50: CPT | Mod: GC

## 2018-06-01 RX ORDER — IPRATROPIUM/ALBUTEROL SULFATE 18-103MCG
3 AEROSOL WITH ADAPTER (GRAM) INHALATION EVERY 6 HOURS
Qty: 0 | Refills: 0 | Status: DISCONTINUED | OUTPATIENT
Start: 2018-06-01 | End: 2018-06-18

## 2018-06-01 RX ADMIN — Medication 1 SPRAY(S): at 17:44

## 2018-06-01 RX ADMIN — ENOXAPARIN SODIUM 40 MILLIGRAM(S): 100 INJECTION SUBCUTANEOUS at 13:54

## 2018-06-01 RX ADMIN — Medication 1 SPRAY(S): at 05:51

## 2018-06-01 NOTE — PROGRESS NOTE ADULT - SUBJECTIVE AND OBJECTIVE BOX
CC: Patient is a 57y old  Female who presents with a chief complaint of cough, positive QuantiFeron test and abnormal cxr, admitted to rule out active tuberculosis (29 May 2018 22:08)    Patient seen and examined at bedside, No acute overnight events. Patient this AM complaining of mild b/l HA as well as leg pain b/l when she tries to ambulate. Patient ambulating, eating well, voiding and last BM yesterday.    ROS: Denies fever, chest pain, SOB, abdominal pain, diarrhea, constipation, isolated calf pain.    VS:   Vital Signs Last 24 Hrs  T(C): 36.7 (01 Jun 2018 05:16), Max: 36.9 (31 May 2018 12:00)  T(F): 98 (01 Jun 2018 05:16), Max: 98.5 (31 May 2018 12:00)  HR: 57 (01 Jun 2018 05:16) (57 - 72)  BP: 129/77 (01 Jun 2018 05:16) (122/60 - 140/70)  RR: 18 (01 Jun 2018 05:16) (18 - 20)  SpO2: 96% (31 May 2018 20:30) (95% - 96%)    Physical Exam:   Gen: NAD  HEENT: NCAT, EOMI, PERRLA, Pupils_  CVS: RRR, +S1/S2, no murmurs, rubs or gallops appreciated  Lungs: CTAB, no wheeze, rales, rhonchi  Abdomen: +BS, soft, ND, NT. no palpable flank tenderness or mass, no CVA tenderness  Ext: No cyanosis, edema or calf tenderness  Neuro: AAOx3, no focal deficits.    Labs:                        13.3   5.4   )-----------( 230      ( 01 Jun 2018 06:59 )             42.3   06-01    141  |  100  |  22.0<H>  ----------------------------<  87  4.5   |  30.0<H>  |  0.63    Ca    9.4      01 Jun 2018 06:59    TPro  7.3  /  Alb  4.0  /  TBili  0.7  /  DBili  x   /  AST  15  /  ALT  16  /  AlkPhos  118  05-31 05-31 @ 07:01  -  06-01 @ 07:00  --------------------------------------------------------  IN: 240 mL / OUT: 500 mL / NET: -260 mL        Radiology:  < from: CT Chest No Cont (05.30.18 @ 20:04) >  IMPRESSION:       1.  Multifocal scarring and multiple small calcifications in the right   lung   signify prior granulomatous disease. No cavitation or consolidation.  2.  Tree in bud opacities in the periphery of the right lower lobe can be   seen   with endobronchial spread of tuberculosis; however, there no cavitary   lesions   to more definitively indicate the presence of reactivation tuberculosis.   More   commonly, tree and opacities will represent viral or bacterial infectious   bronchiolitis.      Medications:  MEDICATIONS  (STANDING):  enoxaparin Injectable 40 milliGRAM(s) SubCutaneous daily  fluticasone propionate 50 MICROgram(s)/spray Nasal Spray 1 Spray(s) Both Nostrils two times a day    MEDICATIONS  (PRN):  acetaminophen   Tablet. 650 milliGRAM(s) Oral every 6 hours PRN Mild Pain (1 - 3)  naproxen 250 milliGRAM(s) Oral every 12 hours PRN Pain

## 2018-06-01 NOTE — PROGRESS NOTE ADULT - ATTENDING COMMENTS
Patient seen and examined at the bedside. Agree with the above history, physical, assessment, and plan with the necessary amendments/elaborations below:    clinically stable. aried minor complaints but many unrelating to effects of TB. some diffuse rhonchi noted today for first time on exam, per pt no hx of smoking but occ 2nd hand smoke exposure. also with no hx copd/asthma, given chronic cough, likely dose have underlying pulm disease separate from TB if present, pulm consult on discharge advised. additionally duoneb q6 prn for improvement, much symptomatic improvement with it on arrival in ED when similar resp findings noted on exam. suspect poss allergic asthma given many allergic complaints.    regarding tb w/u, pending afb cx, all 3 collected. id following. hold on abx until results confirming tb noted as pt asymptomatic. Patient seen and examined at the bedside. Agree with the above history, physical, assessment, and plan with the necessary amendments/elaborations below:    afb neg x3. per id given still high clinical suspicion, bronch + BAL to r/o TB definitively to be done. hold on abx until dx confirmed. CT sx consult placed, requested pulm cs in addition, also placed. will follow. likely to have bronch early next week, if neg dc home with pulm referral.     duonebs prn. will also start trial of protonix for chronic cough in case there is a component of gerd causing her symptoms

## 2018-06-01 NOTE — PROGRESS NOTE ADULT - ASSESSMENT
Pt is 58 yo F with PMH of HLD and previous alcohol abuse for about 30 yrs ( quit 1 yr ago ) , sent from [facility] clinic due to chronic cough associated with sputum production , positive Quantiferon test and hx of TB exposure and prior CXR with calcifications/lesions over Right upper lobe.  Pt will be admitted due to high risk for  active TB.     Rule out Tuberculosis  -Airborne precautions   -CT showing changes suggestive of previous granulomatous disease, no clear indication of active TB, tree in bud chnages in right lobe periphery that may represent bronhiolitis  -sputum culture x 3 collected  , no growth of AFB  -ID consulted noted - will contact pulm for bronchoscopy and BAL to be sent for AFB smear, culture and PCR  -GUNJAN aware of pt ,    HLD  -pt is no taken any medication at this time  -Lipid panel done last week, no need for med at this time , just life style modifications     Preventive measure   -DVT proph: Lovenox 40 mg daily

## 2018-06-01 NOTE — CONSULT NOTE ADULT - SUBJECTIVE AND OBJECTIVE BOX
PULMONARY CONSULT NOTE      CHELSEA DONOVAN-[id number]    Patient is a 57y old  Female who presents with a chief complaint of cough, positive QuantiFeron test and abnormal cxr (29 May 2018 22:08)      HISTORY OF PRESENT ILLNESS:  58 y/o woman with PMH of HLD and alcohol abuse in the past was referred by [facility] clinic for possible TB infection. She has cough with yellow/green sputum, night sweats and weight loss in last 2 years.    No new changes in her symptoms. Appetite is good, no fever.   She lives in [facility] and vising her kids in [place]. [place] to [place] on 5[place]4 and planning to go back in 1-2 months. 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 Never had PPD test in the past.   She also has allergic rhinitis mostly to pollens.  Three of her family members tested positives with QuantiFeron test and they were already treated for 9months.     AFB neg X 3 on sputum  Given abnormal CT, CTS called for bronchoscopy  CTS requested prior to writing their consult note      MEDICATIONS  (STANDING):  enoxaparin Injectable 40 milliGRAM(s) SubCutaneous daily  fluticasone propionate 50 MICROgram(s)/spray Nasal Spray 1 Spray(s) Both Nostrils two times a day      MEDICATIONS  (PRN):  acetaminophen   Tablet. 650 milliGRAM(s) Oral every 6 hours PRN Mild Pain (1 - 3)  ALBUTerol/ipratropium for Nebulization 3 milliLiter(s) Nebulizer every 6 hours PRN Shortness of Breath and/or Wheezing  naproxen 250 milliGRAM(s) Oral every 12 hours PRN Pain      Allergies    No Known Allergies    Intolerances        PAST MEDICAL & SURGICAL HISTORY:  Hyperlipidemia  Alcohol abuse  S/P hernia repair      FAMILY HISTORY:  No pertinent family history in first degree relatives      SOCIAL HISTORY  Smoking History:     REVIEW OF SYSTEMS:    CONSTITUTIONAL:   Sweats noted    HEENT:  Eyes:  No diplopia or blurred vision. ENT:  No earache, sore throat or runny nose.    CARDIOVASCULAR:  No pressure, squeezing, tightness, or heaviness about the chest; no palpitations.    RESPIRATORY:  No  shortness of breath, PND or orthopnea. Mild SOBOE    GASTROINTESTINAL:  No abdominal pain, nausea, vomiting or diarrhea.    GENITOURINARY:  No dysuria, frequency or urgency.    NEUROLOGIC:  No paresthesias, fasciculations, seizures or weakness.    PSYCHIATRIC:  No disorder of thought or mood.    Vital Signs Last 24 Hrs  T(C): 36.5 (01 Jun 2018 09:20), Max: 36.9 (31 May 2018 12:00)  T(F): 97.7 (01 Jun 2018 09:20), Max: 98.5 (31 May 2018 12:00)  HR: 70 (01 Jun 2018 09:20) (57 - 72)  BP: 118/80 (01 Jun 2018 09:20) (118/80 - 140/70)  BP(mean): --  RR: 14 (01 Jun 2018 09:20) (14 - 20)  SpO2: 98% (01 Jun 2018 09:20) (95% - 98%)    PHYSICAL EXAMINATION:    GENERAL: The patient is a well-developed, well-nourished _____in no apparent distress.     HEENT: Head is normocephalic and atraumatic. Extraocular muscles are intact. Mucous membranes are moist.     NECK: Supple.     LUNGS: Scattered rhonchi to auscultation. Respirations unlabored    HEART: Regular rate and rhythm without murmur.    ABDOMEN: Soft, nontender, and nondistended.  No hepatosplenomegaly is noted.    EXTREMITIES: Without any cyanosis, clubbing, rash, lesions or edema.    NEUROLOGIC: Grossly intact.      LABS:                        13.3   5.4   )-----------( 230      ( 01 Jun 2018 06:59 )             42.3     06-01    141  |  100  |  22.0<H>  ----------------------------<  87  4.5   |  30.0<H>  |  0.63    Ca    9.4      01 Jun 2018 06:59    TPro  7.3  /  Alb  4.0  /  TBili  0.7  /  DBili  x   /  AST  15  /  ALT  16  /  AlkPhos  118  05-31    HIV neg    MICROBIOLOGY: No positive findings noted    RADIOLOGY & ADDITIONAL STUDIES:    CT Chest on 5/30/18  IMPRESSION:       1.  Multifocal scarring and multiple small calcifications in the right   lung   signify prior granulomatous disease. No cavitation or consolidation.  2.  Tree in bud opacities in the periphery of the right lower lobe can be   seen   with endobronchial spread of tuberculosis; however, there no cavitary   lesions   to more definitively indicate the presence of reactivation tuberculosis.   More   commonly, tree and opacities will represent viral or bacterial infectious   bronchiolitis.    NINO MCGRAW M.D., ATTENDING RADIOLOGIST  This document has been electronically signed. May 31 2018  6:53AM

## 2018-06-01 NOTE — CONSULT NOTE ADULT - ASSESSMENT
56 y/o F with a h/o HLD , ETOH and  TB presented to ED with cough, night sweats and weight loss , currently visiting from [facility] being worked up here to rule out active TB.  Consulted for possible bronch BAL

## 2018-06-01 NOTE — CONSULT NOTE ADULT - ASSESSMENT
Assess    At least latent TB  Can't exclude, though doubt active TB  T-I-B infiltrates suggest bronchiolitis      Rec    Bronchoscopy by thoracic if advised by ID per GUNJAN guidelines  This is a public health issue and [facility] guidelines determine HARMON and Rx  We do not perform bronchoscopy nor do we treat latent or active TB due to GUNJAN concerns

## 2018-06-01 NOTE — CONSULT NOTE ADULT - ATTENDING COMMENTS
Pt seen and imaging reviewed.  R/o TB?  Will discuss need for bronch with ID but appears sputum neg?

## 2018-06-01 NOTE — CONSULT NOTE ADULT - SUBJECTIVE AND OBJECTIVE BOX
Surgeon: Yuly    Consult requesting by: Diana    HISTORY OF PRESENT ILLNESS:  Pt is 58 yo F with PMH of HLD and previous alcohol abuse for about 30 yrs ( quit 1 yr ago ). 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 Pt denies hx of smoking or second had smoking, but she does report hx of wood use for cooking for about 40 yrs. Recently pt went to [facility] clinic and her QuantiFeron test was positive , they ordered a cxr and report came back showing calcifications and possible granulomas in the RUL. Pt was sent to [facility] for evaluation and tx. Recently she was prescribe a PPI and Claritin but these medications did not help with her cough. Pt denies fever, chest pain , abdominal pain , weight loss, diarrhea, or sick contacts. Pt has been traveling back and forward to [facility]. Three of her family members tested positives with QuantiFeron test and they were already tx for a total of 9 months .     PAST MEDICAL & SURGICAL HISTORY:  Hyperlipidemia  Alcohol abuse  S/P hernia repair      MEDICATIONS  (STANDING):  enoxaparin Injectable 40 milliGRAM(s) SubCutaneous daily  fluticasone propionate 50 MICROgram(s)/spray Nasal Spray 1 Spray(s) Both Nostrils two times a day    MEDICATIONS  (PRN):  acetaminophen   Tablet. 650 milliGRAM(s) Oral every 6 hours PRN Mild Pain (1 - 3)  ALBUTerol/ipratropium for Nebulization 3 milliLiter(s) Nebulizer every 6 hours PRN Shortness of Breath and/or Wheezing  naproxen 250 milliGRAM(s) Oral every 12 hours PRN Pain    Antiplatelet therapy:   Lovenox                        Last dose/amt: currently    Allergies    No Known Allergies    Intolerances        SOCIAL HISTORY:  Smoker: [ ] Yes  [x ] No        PACK YEARS:                         WHEN QUIT?  ETOH use: [x ] Yes  [ ] No              FREQUENCY / QUANTITY:  Ilicit Drug use:  [ ] Yes  [ x] No  Occupation: visiting from [facility]   Live with: family here,  back in [facility]      FAMILY HISTORY:  No pertinent family history in first degree relatives      Review of Systems  CONSTITUTIONAL:  Fevers[ ] chills[ ] sweats[x ] fatigue[x ] weight loss[ ] weight gain [ ]                                     NEGATIVE [ ]   NEURO:  parathesias[ ] seizures [ ]  syncope [ ]  confusion [ ]                                                                                NEGATIVEx[ ]   EYES: glasses[ ]  blurry vision[ ]  discharge[ ] pain[ ] glaucoma [ ]                                                                          NEGATIVE[x ]   ENMT:  difficulty hearing [ ]  vertigo[ ]  dysphagia[ ] epistaxis[ ] recent dental work [ ]                                    NEGATIVE[x ]   CV:  chest pain[ ] palpitations[ ] DIEHL [x ] diaphoresis [ ]                                                                                           NEGATIVE[ ]   RESPIRATORY:  wheezing[ ] SOB[ ] cough [x ] sputum[x ] hemoptysis[ ]                                                                  NEGATIVE[ ]   GI:  nausea[ ]  vommiting [ ]  diarrhea[ ] constipation [ ] melena [ ]                                                                      NEGATIVE[x ]   : hematuria[ ]  dysuria[ ] urgency[ ] incontinence[ ]                                                                                            NEGATIVE[x ]   MUSKULOSKELETAL:  arthritis[ ]  joint swelling [ ] muscle weakness [ ]                                                                NEGATIVE[x ]   SKIN/BREAST:  rash[ ] itching [ ]  hair loss[ ] masses[ ]                                                                                              NEGATIVE[x ]   PSYCH:  dementia [ ] depresion [ ] anxiety[ ]                                                                                                               NEGATIVE[x ]   HEME/LYMPH:  bruises easily[ ] enlarged lymph nodes[ ] tender lymph nodes[ ]                                               NEGATIVE[ x]   ENDOCRINE:  cold intolerance[ ] heat intolerance[ ] polydipsia[ ]                                                                          NEGATIVE[ x]     PHYSICAL EXAM  Vital Signs Last 24 Hrs  T(C): 36.5 (01 Jun 2018 09:20), Max: 36.7 (01 Jun 2018 05:16)  T(F): 97.7 (01 Jun 2018 09:20), Max: 98 (01 Jun 2018 05:16)  HR: 70 (01 Jun 2018 09:20) (57 - 70)  BP: 118/80 (01 Jun 2018 09:20) (118/80 - 140/70)  BP(mean): --  RR: 14 (01 Jun 2018 09:20) (14 - 18)  SpO2: 98% (01 Jun 2018 09:20) (96% - 98%)    CONSTITUTIONAL:                                                                          WNL[x ]   Neuro: WNL[x ] Normal exam oriented to person/place & time with no focal motor or sensory  deficits. Other                     Eyes: WNL[x ]   Normal exam of conjunctiva & lids, pupils equally reactive. Other     ENT: WNL[ x]    Normal exam of nasal/oral mucosa with absence of cyanosis. Other  Neck: WNL[x ]  Normal exam of jugular veins, trachea & thyroid. Other  Chest: WNL[ ] Normal lung exam with good air movement absence of wheezes, rales, or rhonchi: Other  decreased at bases b/l                                                                                CV:  Auscultation: normal [x ] S3[ ] S4[ ] Irregular [ ] Rub[ ] Clicks[ ]    Murmurs none:[x ]systolic [ ]  diastolic [ ] holosystolic [ ]  Carotids: No Bruits[x ] Other                 Abdominal Aorta: normal [ ] nonpalpable[ ]Other                                                                                      GI:           WNL[x ] Normal exam of abdomen, liver & spleen with no noted masses or tenderness. Other                                                                                                        Extremities: WNL[x ] Normal no evidence of cyanosis or deformity Edema: none[ ]trace[ ]1+[ ]2+[ ]3+[ ]4+[ ]  Lower Extremity Pulses: Right[2+ ] Left[2+ ]Varicosities[ ]  SKIN :WNL[x ] Normal exam to inspection & palation. Other:                                                          LABS:                        13.3   5.4   )-----------( 230      ( 01 Jun 2018 06:59 )             42.3     06-01    141  |  100  |  22.0<H>  ----------------------------<  87  4.5   |  30.0<H>  |  0.63    Ca    9.4      01 Jun 2018 06:59    TPro  7.3  /  Alb  4.0  /  TBili  0.7  /  DBili  x   /  AST  15  /  ALT  16  /  AlkPhos  118  05-31

## 2018-06-02 ENCOUNTER — TRANSCRIPTION ENCOUNTER (OUTPATIENT)
Age: 58
End: 2018-06-02

## 2018-06-02 LAB
ANION GAP SERPL CALC-SCNC: 12 MMOL/L — SIGNIFICANT CHANGE UP (ref 5–17)
BASOPHILS # BLD AUTO: 0 K/UL — SIGNIFICANT CHANGE UP (ref 0–0.2)
BASOPHILS NFR BLD AUTO: 0.6 % — SIGNIFICANT CHANGE UP (ref 0–2)
BUN SERPL-MCNC: 19 MG/DL — SIGNIFICANT CHANGE UP (ref 8–20)
CALCIUM SERPL-MCNC: 9.7 MG/DL — SIGNIFICANT CHANGE UP (ref 8.6–10.2)
CHLORIDE SERPL-SCNC: 99 MMOL/L — SIGNIFICANT CHANGE UP (ref 98–107)
CO2 SERPL-SCNC: 27 MMOL/L — SIGNIFICANT CHANGE UP (ref 22–29)
CREAT SERPL-MCNC: 0.61 MG/DL — SIGNIFICANT CHANGE UP (ref 0.5–1.3)
EOSINOPHIL # BLD AUTO: 0.8 K/UL — HIGH (ref 0–0.5)
EOSINOPHIL NFR BLD AUTO: 12.5 % — HIGH (ref 0–6)
GLUCOSE SERPL-MCNC: 95 MG/DL — SIGNIFICANT CHANGE UP (ref 70–115)
HCT VFR BLD CALC: 43.5 % — SIGNIFICANT CHANGE UP (ref 37–47)
HGB BLD-MCNC: 13.6 G/DL — SIGNIFICANT CHANGE UP (ref 12–16)
LYMPHOCYTES # BLD AUTO: 2.6 K/UL — SIGNIFICANT CHANGE UP (ref 1–4.8)
LYMPHOCYTES # BLD AUTO: 39.5 % — SIGNIFICANT CHANGE UP (ref 20–55)
MCHC RBC-ENTMCNC: 28.2 PG — SIGNIFICANT CHANGE UP (ref 27–31)
MCHC RBC-ENTMCNC: 31.3 G/DL — LOW (ref 32–36)
MCV RBC AUTO: 90.1 FL — SIGNIFICANT CHANGE UP (ref 81–99)
MONOCYTES # BLD AUTO: 0.4 K/UL — SIGNIFICANT CHANGE UP (ref 0–0.8)
MONOCYTES NFR BLD AUTO: 6 % — SIGNIFICANT CHANGE UP (ref 3–10)
NEUTROPHILS # BLD AUTO: 2.6 K/UL — SIGNIFICANT CHANGE UP (ref 1.8–8)
NEUTROPHILS NFR BLD AUTO: 39.9 % — SIGNIFICANT CHANGE UP (ref 37–73)
PLATELET # BLD AUTO: 271 K/UL — SIGNIFICANT CHANGE UP (ref 150–400)
POTASSIUM SERPL-MCNC: 4.4 MMOL/L — SIGNIFICANT CHANGE UP (ref 3.5–5.3)
POTASSIUM SERPL-SCNC: 4.4 MMOL/L — SIGNIFICANT CHANGE UP (ref 3.5–5.3)
RBC # BLD: 4.83 M/UL — SIGNIFICANT CHANGE UP (ref 4.4–5.2)
RBC # FLD: 14.3 % — SIGNIFICANT CHANGE UP (ref 11–15.6)
SODIUM SERPL-SCNC: 138 MMOL/L — SIGNIFICANT CHANGE UP (ref 135–145)
WBC # BLD: 6.6 K/UL — SIGNIFICANT CHANGE UP (ref 4.8–10.8)
WBC # FLD AUTO: 6.6 K/UL — SIGNIFICANT CHANGE UP (ref 4.8–10.8)

## 2018-06-02 PROCEDURE — 99233 SBSQ HOSP IP/OBS HIGH 50: CPT | Mod: GC

## 2018-06-02 PROCEDURE — 99255 IP/OBS CONSLTJ NEW/EST HI 80: CPT

## 2018-06-02 RX ORDER — PANTOPRAZOLE SODIUM 20 MG/1
40 TABLET, DELAYED RELEASE ORAL
Qty: 0 | Refills: 0 | Status: DISCONTINUED | OUTPATIENT
Start: 2018-06-02 | End: 2018-06-18

## 2018-06-02 RX ORDER — TRAMADOL HYDROCHLORIDE 50 MG/1
25 TABLET ORAL EVERY 8 HOURS
Qty: 0 | Refills: 0 | Status: DISCONTINUED | OUTPATIENT
Start: 2018-06-02 | End: 2018-06-08

## 2018-06-02 RX ADMIN — Medication 1 SPRAY(S): at 18:41

## 2018-06-02 RX ADMIN — Medication 1 SPRAY(S): at 05:15

## 2018-06-02 RX ADMIN — PANTOPRAZOLE SODIUM 40 MILLIGRAM(S): 20 TABLET, DELAYED RELEASE ORAL at 12:29

## 2018-06-02 RX ADMIN — Medication 250 MILLIGRAM(S): at 12:29

## 2018-06-02 RX ADMIN — ENOXAPARIN SODIUM 40 MILLIGRAM(S): 100 INJECTION SUBCUTANEOUS at 12:29

## 2018-06-02 RX ADMIN — Medication 250 MILLIGRAM(S): at 13:15

## 2018-06-02 NOTE — DISCHARGE NOTE ADULT - NS AS ACTIVITY OBS
Stairs allowed/Showering allowed/Driving allowed/Bathing allowed/No Heavy lifting/straining/Sex allowed

## 2018-06-02 NOTE — DISCHARGE NOTE ADULT - HOSPITAL COURSE
Pt is 58 yo F with PMH of HLD and previous alcohol abuse for about 30 yrs ( quit 1 yr ago ) , sent from [facility] clinic due to chronic cough associated with sputum production , positive Quantiferon test and hx of TB exposure and prior CXR with calcifications/lesions over Right upper lobe.  Patient was admitted for rule out Tb with 3 negative acid fast sputums were obtained. Pt is 58 yo F with PMH of HLD and previous alcohol abuse for about 30 yrs ( quit 1 yr ago ) , sent from [facility] clinic due to chronic cough associated with sputum production , positive Quantiferon test and hx of TB exposure and prior CXR with calcifications/lesions over Right upper lobe.  Patient was admitted for rule out Tb with 3 negative acid fast sputums that were obtained. Infectious disease was consulted and recommended per [facility] protocols that a Bronchoscopy be performed to obtain BAL for culture and staining and plan to start RIPE treatment upon completion of the procedure while awaiting said results. CT surgery was consulted to obtain the Bronch with CT surgery requesting reasoning from ID to perform said procedure despite 3 negative AFB stains....... Pt is 56 yo F with PMH of HLD and previous alcohol abuse for about 30 yrs ( quit 1 yr ago ) , sent from [facility] clinic due to chronic cough associated with sputum production, positive QuantiFeron test and hx of TB exposure in the past and prior CXR with calcifications/lesions over right upper lobe concerning for active vs latent TB infection. Sputum cx were negative x 3, Sputum AFB negative, s/p bronch with BAL shows negative AFB smear. BAL cx grew H. parainfluenzae likely respiratory negro. Pt was started on empiric RIPE treatment with treatment for active TB until workup is negative at which point treatment will shift to latent TB as per GUNJAN. Opthalmology was consulted as pt on Ethambutol with concern for decreased visual acuity and found decreased visual acuity is likely secondary to cataract and recommended no acute intervention. In addition, pt was found to have elevated LFTs, which normalized within 4 days without intervention. Prior to discharge, pt was afebrile, ambulating, eating, drinking and voiding appropriately. Pt medically optimized for discharge and advised to return if any concerns arise.     45 minutes were spent on discharge planning. 58 yo Female PMH of HLD and previous alcohol abuse for about 30 yrs ( quit 1 yr ago ), sent from [facility] clinic due to chronic cough associated with sputum production, positive QuantiFeron test and hx of TB exposure in the past and prior CXR with calcifications/lesions over right upper lobe concerning for active vs latent TB infection. Sputum cx were negative x 3, Sputum AFB negative, s/p bronch with BAL shows negative AFB smear. BAL cx grew H. parainfluenzae likely respiratory negro. Pt was started on empiric RIPE treatment with treatment for active TB until workup is negative at which point treatment will shift to latent TB as per GUNJAN. Opthalmology was consulted as pt on Ethambutol with concern for decreased visual acuity and found decreased visual acuity is likely secondary to cataract and recommended no acute intervention. In addition, pt was found to have elevated LFTs, which normalized within 4 days without intervention. Pt medically optimized for discharge.    Patient has outpatient appointment with ID scheduled for ...  Patient has outpatient appointment with [facility] clinic scheduled for....     45 minutes were spent on discharge planning. 58 yo Female PMH of HLD and previous alcohol abuse for about 30 yrs ( quit 1 yr ago ), sent from [facility] clinic due to chronic cough associated with sputum production, positive QuantiFeron test and hx of TB exposure in the past and prior CXR with calcifications/lesions over right upper lobe concerning for active vs latent TB infection. Sputum cx were negative x 3, Sputum AFB negative, s/p bronch with BAL shows negative AFB smear. BAL cx grew H. parainfluenzae likely respiratory negro. Pt was started on empiric RIPE treatment with treatment for active TB until workup is negative at which point treatment will shift to latent TB as per GUNJAN. Opthalmology was consulted as pt on Ethambutol with concern for decreased visual acuity and found decreased visual acuity is likely secondary to cataract and recommended no acute intervention. In addition, pt was found to have elevated LFTs, which normalized within 4 days without intervention. Pt medically optimized for discharge.    Patient has outpatient appointment with ID scheduled for   Patient has outpatient appointment with [facility] clinic scheduled for 06/21/2018 at 15:00    45 minutes were spent on discharge planning. 58 yo Female PMH of HLD and previous alcohol abuse for about 30 yrs ( quit 1 yr ago ), sent from [facility] clinic due to chronic cough associated with sputum production, positive QuantiFeron test and hx of TB exposure in the past and prior CXR with calcifications/lesions over right upper lobe concerning for active vs latent TB infection. Sputum cx were negative x 3, Sputum AFB negative, s/p bronch with BAL shows negative AFB smear. BAL cx grew H. parainfluenzae likely respiratory negro. Pt was started on empiric RIPE treatment with treatment for active TB until workup is negative at which point treatment will shift to latent TB as per GUNJAN. Opthalmology was consulted as pt on Ethambutol with concern for decreased visual acuity and found decreased visual acuity is likely secondary to cataract and recommended no acute intervention. In addition, pt was found to have elevated LFTs, which normalized within 4 days without intervention. Pt medically optimized for discharge.    Patient has to make outpatient appointment with ID  Patient has outpatient appointment with [facility] clinic scheduled for 06/21/2018 at 15:00    45 minutes were spent on discharge planning. 56 yo Female PMH of HLD and previous alcohol abuse for about 30 yrs ( quit 1 yr ago ), sent from [facility] clinic due to chronic cough associated with sputum production, positive QuantiFeron test and hx of TB exposure in the past and prior CXR with calcifications/lesions over right upper lobe concerning for active vs latent TB infection. Sputum cx were negative x 3, Sputum AFB negative, s/p bronch with BAL shows negative AFB smear. BAL cx grew H. parainfluenzae likely respiratory negro. Pt was started on empiric RIPE treatment with treatment for active TB until workup is negative at which point treatment will shift to latent TB as per GUNJAN. Opthalmology was consulted as pt on Ethambutol with concern for decreased visual acuity and found decreased visual acuity is likely secondary to cataract and recommended no acute intervention. In addition, pt was found to have elevated LFTs, which normalized within 4 days without intervention. On day of discharge, noted to have elevated uric acid level, will need to have it repeated at f/up visit. Pt medically optimized for discharge.    Patient has to make outpatient appointment with ID  Patient has outpatient appointment with [facility] clinic scheduled for 06/21/2018 at 3pm   Plan d/w patient in [place],  and she is ready to d/c home today     45 minutes were spent on discharge planning.

## 2018-06-02 NOTE — DISCHARGE NOTE ADULT - PATIENT PORTAL LINK FT
You can access the [facility] Patient Portal, offered by [facility], by registering with the following website: [email or web address]

## 2018-06-02 NOTE — DISCHARGE NOTE ADULT - MEDICATION SUMMARY - MEDICATIONS TO TAKE
I will START or STAY ON the medications listed below when I get home from the hospital:    Claritin 10 mg oral tablet  -- 1 tab(s) by mouth once a day  -- Indication: For Allergies    isoniazid 300 mg oral tablet  -- 1 tab(s) by mouth once a day   -- Do not drink alcoholic beverages when taking this medication.  It is very important that you take or use this exactly as directed.  Do not skip doses or discontinue unless directed by your doctor.  Take medication on an empty stomach 1 hour before or 2 to 3 hours after a meal unless otherwise directed by your doctor.    -- Indication: For Tuberculosis    ethambutol 400 mg oral tablet  -- 3 tab(s) by mouth once a day   -- Finish all this medication unless otherwise directed by prescriber.  It is very important that you take or use this exactly as directed.  Do not skip doses or discontinue unless directed by your doctor.    -- Indication: For Tuberculosis    rifAMPin 300 mg oral capsule  -- 2 cap(s) by mouth once a day   -- It is very important that you take or use this exactly as directed.  Do not skip doses or discontinue unless directed by your doctor.  May discolor urine or feces.  Take medication on an empty stomach 1 hour before or 2 to 3 hours after a meal unless otherwise directed by your doctor.    -- Indication: For Tuberculosis    pyrazinamide 500 mg oral tablet  -- 3 tab(s) by mouth once a day   -- Finish all this medication unless otherwise directed by prescriber.  It is very important that you take or use this exactly as directed.  Do not skip doses or discontinue unless directed by your doctor.    -- Indication: For Tuberculosis    pyrazinamide 500 mg oral tablet  -- 3 tab(s) by mouth once a day   -- Finish all this medication unless otherwise directed by prescriber.  It is very important that you take or use this exactly as directed.  Do not skip doses or discontinue unless directed by your doctor.    -- Indication: For Tuberculosis    Rifadin 300 mg oral capsule  -- 2 cap(s) by mouth once a day   -- It is very important that you take or use this exactly as directed.  Do not skip doses or discontinue unless directed by your doctor.  May discolor urine or feces.  Take medication on an empty stomach 1 hour before or 2 to 3 hours after a meal unless otherwise directed by your doctor.    -- Indication: For Tuberculosis    isoniazid 300 mg oral tablet  -- 1 tab(s) by mouth once a day   -- Do not drink alcoholic beverages when taking this medication.  It is very important that you take or use this exactly as directed.  Do not skip doses or discontinue unless directed by your doctor.  Take medication on an empty stomach 1 hour before or 2 to 3 hours after a meal unless otherwise directed by your doctor.    -- Indication: For Tuberculosis    ethambutol 400 mg oral tablet  -- 3 tab(s) by mouth once a day   -- Finish all this medication unless otherwise directed by prescriber.  It is very important that you take or use this exactly as directed.  Do not skip doses or discontinue unless directed by your doctor.    -- Indication: For Tuberculosis    ethambutol 400 mg oral tablet  -- 3 tab(s) by mouth once a day   -- Finish all this medication unless otherwise directed by prescriber.  It is very important that you take or use this exactly as directed.  Do not skip doses or discontinue unless directed by your doctor.    -- Indication: For Tuberculosis    isoniazid 100 mg oral tablet  -- 3 tab(s) by mouth once a day   -- Do not drink alcoholic beverages when taking this medication.  It is very important that you take or use this exactly as directed.  Do not skip doses or discontinue unless directed by your doctor.  Take medication on an empty stomach 1 hour before or 2 to 3 hours after a meal unless otherwise directed by your doctor.    -- Indication: For Tuberculosis    pyrazinamide 500 mg oral tablet  -- 3 tab(s) by mouth once a day   -- Finish all this medication unless otherwise directed by prescriber.  It is very important that you take or use this exactly as directed.  Do not skip doses or discontinue unless directed by your doctor.    -- Indication: For Tuberculosis    rifAMPin 300 mg oral capsule  -- 2 cap(s) by mouth once a day   -- It is very important that you take or use this exactly as directed.  Do not skip doses or discontinue unless directed by your doctor.  May discolor urine or feces.  Take medication on an empty stomach 1 hour before or 2 to 3 hours after a meal unless otherwise directed by your doctor.    -- Indication: For Tuberculosis    omeprazole 20 mg oral delayed release capsule  -- 1 cap(s) by mouth once a day  -- Indication: For Heartburn    Vitamin B6 100 mg oral tablet  -- 1 tab(s) by mouth once a day  -- Indication: For Preventive measure I will START or STAY ON the medications listed below when I get home from the hospital:    Claritin 10 mg oral tablet  -- 1 tab(s) by mouth once a day  -- Indication: For Allergies    ethambutol 400 mg oral tablet  -- 3 tab(s) by mouth once a day   -- Finish all this medication unless otherwise directed by prescriber.  It is very important that you take or use this exactly as directed.  Do not skip doses or discontinue unless directed by your doctor.    -- Indication: For Tuberculosis    pyrazinamide 500 mg oral tablet  -- 3 tab(s) by mouth once a day   -- Finish all this medication unless otherwise directed by prescriber.  It is very important that you take or use this exactly as directed.  Do not skip doses or discontinue unless directed by your doctor.    -- Indication: For Tuberculosis    Rifadin 300 mg oral capsule  -- 2 cap(s) by mouth once a day   -- It is very important that you take or use this exactly as directed.  Do not skip doses or discontinue unless directed by your doctor.  May discolor urine or feces.  Take medication on an empty stomach 1 hour before or 2 to 3 hours after a meal unless otherwise directed by your doctor.    -- Indication: For Tuberculosis    isoniazid 100 mg oral tablet  -- 3 tab(s) by mouth once a day   -- Do not drink alcoholic beverages when taking this medication.  It is very important that you take or use this exactly as directed.  Do not skip doses or discontinue unless directed by your doctor.  Take medication on an empty stomach 1 hour before or 2 to 3 hours after a meal unless otherwise directed by your doctor.    -- Indication: For Tuberculosis    albuterol 90 mcg/inh inhalation aerosol  -- 2 puff(s) inhaled every 4 hours, As Needed -for shortness of breath and/or wheezing   -- For inhalation only.  It is very important that you take or use this exactly as directed.  Do not skip doses or discontinue unless directed by your doctor.  Obtain medical advice before taking any non-prescription drugs as some may affect the action of this medication.  Shake well before use.    -- Indication: For Asthma    omeprazole 20 mg oral delayed release capsule  -- 1 cap(s) by mouth once a day  -- Indication: For Heartburn    Vitamin B6 100 mg oral tablet  -- 1 tab(s) by mouth once a day  -- Indication: For Preventive measure

## 2018-06-02 NOTE — PROGRESS NOTE ADULT - ASSESSMENT
Pt is 58 yo F with PMH of HLD and previous alcohol abuse for about 30 yrs ( quit 1 yr ago ) , sent from [facility] clinic due to chronic cough associated with sputum production , positive Quantiferon test and hx of TB exposure and prior CXR with calcifications/lesions over Right upper lobe.  Pt will be admitted due to high risk for  active TB.     Rule out Tuberculosis  -Airborne precautions   -CT showing changes suggestive of previous granulomatous disease, no clear indication of active TB, tree in bud chnages in right lobe periphery that may represent bronhiolitis  -sputum culture x 3 collected  , no growth of AFB  -ID consulted noted - consulted CT surg and Pulm for bronchoscopy and BAL to be sent for AFB smear, culture and PCR  - Pulm consult appreciated  - Ct surg PA consults appreciated, pending final recs from attending   -GUNJAN aware of pt ,    HLD  -pt is no taken any medication at this time  -Lipid panel done last week, no need for med at this time , just life style modifications     Preventive measure   -DVT proph: Lovenox 40 mg daily Pt is 58 yo F with PMH of HLD and previous alcohol abuse for about 30 yrs ( quit 1 yr ago ) , sent from [facility] clinic due to chronic cough associated with sputum production , positive Quantiferon test and hx of TB exposure and prior CXR with calcifications/lesions over Right upper lobe.  Pt will be admitted due to high risk for  active TB.     Rule out Tuberculosis  -Airborne precautions   -CT showing changes suggestive of previous granulomatous disease, no clear indication of active TB, tree in bud chnages in right lobe periphery that may represent bronhiolitis  -sputum culture x 3 collected, no AFB stains noted  -ID consulted noted - consulted CT surg and Pulm for bronchoscopy and BAL to be sent for AFB smear, culture and PCR  - Pulm consult appreciated  - Ct surg PA consults appreciated, pending final recs from attending   -GUNJAN aware of pt ,    HLD  -pt is no taken any medication at this time  -Lipid panel done last week, no need for med at this time , just life style modifications     Preventive measure   -DVT proph: Lovenox 40 mg daily 57 y.o. F with hx of HLD and previous alcohol abuse for about 30 yrs ( quit 1 yr ago ), sent from [facility] clinic due to chronic cough associated with sputum production, positive Quantiferon test and hx of TB exposure in the past and prior CXR with calcifications/lesions over Right upper lobe.  Pt will be admitted due to high risk for active TB. S/p AFB negative x 3. ID continues to follow the patient and CT sx consulted for possible bronchoscopy for BAL AFB smear.     1- QuantiFeron positive with RUL lesions/ calcifications and hx TB exposure and concern for active TB infections vs latent TB, high risk for TB   -pt with complaints of chronic cough no other complaints at this time   -c/w airborne precautions   -CT showing changes suggestive of previous granulomatous disease, no clear indication of active TB, tree in bud changes in right lobe periphery that may represent bronchiolitis  -sputum culture x 3 collected, no AFB stains noted  -ID consulted noted and appreciated and recommended  CT surgery and Pulm for bronchoscopy and BAL to be sent for AFB smear, culture and PCR if AFB smear negative x 3   - Pulm consult noted and appreciated recommended bronchoscopy by thoracic if advised by ID per [facility] guidelines  - Ct surgery f/u noted and appreciated and wants to d/w ID in regards to need for BAL for AFB smear   -GUNJAN aware of pt and will be contacted again on monday for further management     2-HLD  -pt is not taking any medications at this time  -Lipid panel done last week wnl  -c/w lifestyle modifications diet and exercise     3- Preventive measure   -DVT proph: Lovenox 40 mg sq daily 57 y.o. F with hx of HLD and previous alcohol abuse for about 30 yrs ( quit 1 yr ago ), sent from [facility] clinic due to chronic cough associated with sputum production, positive Quantiferon test and hx of TB exposure in the past and prior CXR with calcifications/lesions over Right upper lobe.  Pt will be admitted due to high risk for active TB. S/p AFB negative x 3. ID continues to follow the patient and CT sx consulted for possible bronchoscopy for BAL AFB smear.     1- QuantiFeron positive with RUL lesions/ calcifications and hx TB exposure and concern for active TB infections vs latent TB, high risk for TB   -pt with complaints of chronic cough no other complaints at this time   -c/w airborne precautions   -CT chest with multi focal scarring and multiple small calcifications in the right lung signify prior granulomatous disease. No cavitation or consolidation. Tree in bud opacities in the periphery of the right lower lobe showing changes suggestive of previous granulomatous disease, no clear indication of active TB, may represent bronchiolitis  -sputum culture x 3 collected, no AFB stains noted  -ID consulted noted and appreciated and recommended  CT surgery and Pulm for bronchoscopy and BAL to be sent for AFB smear, culture and PCR if AFB smear negative x 3   - Pulm consult noted and appreciated recommended bronchoscopy by thoracic if advised by ID per [facility] guidelines  - Ct surgery f/u noted and appreciated and wants to d/w ID in regards to need for BAL for AFB smear   -GUNJAN aware of pt and will be contacted again on monday for further management     2-HLD  -pt is not taking any medications at this time  -Lipid panel done last week wnl  -c/w lifestyle modifications diet and exercise     3- Preventive measure   -DVT proph: Lovenox 40 mg sq daily

## 2018-06-02 NOTE — DISCHARGE NOTE ADULT - PLAN OF CARE
s/p treatment with RIPE for 2 weeks;     Patient will follow up with pcp at [facility] clinic on ...  Patient will follow up with ID on ...    meaghan sure that your family, friends, and the people you work with are tested.    Avoid close contact with others until your healthcare provider says it is OK.    Keep your hands clean. Be sure to wash them every time you use them to cover your mouth when you cough.    When you cough or sneeze, take steps to prevent the spread of TB:    Cover your mouth and nose with a tissue.    Put your used tissue in a closed bag and throw it away.    If you don't have a tissue, cough or sneeze into your upper sleeve or elbow, not your hands.    Wash your hands often with soap and warm water for 20 seconds. If soap and water are not available, use an alcohol-based hand gel. 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  Avoid close contact with others until your healthcare provider says it is OK.  Keep your hands clean. Be sure to wash them every time you use them to cover your mouth when you cough.  When you cough or sneeze, take steps to prevent the spread of TB:  Cover your mouth and nose with a tissue.  Put your used tissue in a closed bag and throw it away.  If you don't have a tissue, cough or sneeze into your upper sleeve or elbow, not your hands.  Wash your hands often with soap and warm water for 20 seconds. If soap and water are not available, use an alcohol-based hand gel. Watch from any numbness or tingling, or abdominal pain while on tb medications; -Patient is not taking any medications at this time  -c/w lifestyle modifications diet and exercise as described above. s/p treatment with RIPE for 2 weeks;     Patient will follow up with PCP at [facility] clinic on 06/21/2018 and will meet with GUNJAN to witness her taking dose of medication   Patient will follow up with ID   Make sure that your family, friends, and the people you work with are tested.  Avoid close contact with others until your healthcare provider says it is OK.  Keep your hands clean. Be sure to wash them every time you use them to cover your mouth when you cough.  When you cough or sneeze, take steps to prevent the spread of TB:  Cover your mouth and nose with a tissue.  Put your used tissue in a closed bag and throw it away.  If you don't have a tissue, cough or sneeze into your upper sleeve or elbow, not your hands.  Wash your hands often with soap and warm water for 20 seconds. If soap and water are not available, use an alcohol-based hand gel. s/p treatment with RIPE for 2 weeks;     Uric acid serum increase, likely secondary to treatment, will repeat level at [facility]  Patient will follow up with PCP at [facility] clinic on 06/21/2018 and will meet with GUNJAN to witness her taking dose of medication   Patient will follow up with ID   Make sure that your family, friends, and the people you work with are tested.  Avoid close contact with others until your healthcare provider says it is OK.  Keep your hands clean. Be sure to wash them every time you use them to cover your mouth when you cough.  When you cough or sneeze, take steps to prevent the spread of TB:  Cover your mouth and nose with a tissue.  Put your used tissue in a closed bag and throw it away.  If you don't have a tissue, cough or sneeze into your upper sleeve or elbow, not your hands.  Wash your hands often with soap and warm water for 20 seconds. If soap and water are not available, use an alcohol-based hand gel. s/p treatment with RIPE for 2 weeks with the last day being on 6/18;     Uric acid serum increased from 6 to 8.2, likely secondary to treatment, will repeat level at [facility] on 6/21   Patient will follow up with PCP at [facility] clinic on 06/21/2018 and will meet with GUNJAN to witness her taking dose of medication. On the day of the appointment, please do not take your medications. Bring them with you to the clinic and the GUNJAN will be there to assist you with further care   Patient will follow up with ID   Make sure that your family, friends, and the people you work with are tested.  Avoid close contact with others until your healthcare provider says it is OK.  Keep your hands clean. Be sure to wash them every time you use them to cover your mouth when you cough.  When you cough or sneeze, take steps to prevent the spread of TB:  Cover your mouth and nose with a tissue.  Put your used tissue in a closed bag and throw it away.  If you don't have a tissue, cough or sneeze into your upper sleeve or elbow, not your hands.  Wash your hands often with soap and warm water for 20 seconds. If soap and water are not available, use an alcohol-based hand gel. Watch from any numbness or tingling, or abdominal pain while on tb medications;  repeated and with no significant change. Will need to follow up clinic

## 2018-06-02 NOTE — DISCHARGE NOTE ADULT - PROVIDER TOKENS
FREE:[LAST:[[facility]],PHONE:[phone]],FAX:[(   )    -],ADDRESS:[[address] [place] [address]]] FREE:[LAST:[[facility]],PHONE:[phone]],FAX:[(   )    -],ADDRESS:[[address] [place] [address]]],TOKEN:'[id number]'

## 2018-06-02 NOTE — DISCHARGE NOTE ADULT - INSTRUCTIONS
Continue a DASH (Dietary Approaches to Stop Hypertension) diet. Include more fruits, vegetables, whole grains, and low-fat dairy. Reduce intake of added sugars, solid fats, refined grains, and sodium. Limit foods that are high in saturated fat, such as fatty meats, full-fat dairy products, and tropical oils such as coconut, palm kernel, and palm oils. Limit sugar-sweetened beverages and sweets. When following the DASH eating plan, it is important to choose foods that are:  Low in saturated and trans fats  Rich in potassium, calcium, magnesium, fiber, and protein  Lower in sodium - Heart-healthy fish. Eat heart-healthy fish at least twice a week. Fish can be a good alternative to high-fat meats. For example, cod, tuna and halibut have less total fat, saturated fat and cholesterol than do meat and poultry. Fish such as salmon, mackerel, tuna, sardines and bluefish are rich in omega-3 fatty acids, which promote heart health by lowering blood fats called triglycerides. Avoid fried fish and fish with high levels of mercury, such as tilefish, swordfish and fletcher mackerel.    - "Good" fats. Foods containing monounsaturated and polyunsaturated fats can help lower your cholesterol levels. These include avocados, almonds, pecans, walnuts, olives, and canola, olive and peanut oils. But don't overdo it, as all fats are high in calories.    Foods to avoid:  - Saturated fats. High-fat dairy products and animal proteins such as beef, hot dogs, sausage and arnold contain saturated fats. Limit your daily calories from saturated fat to less than 7 percent.  - Trans fats. These types of fats are found in processed snacks, baked goods, shortening and stick margarines. Avoid these items.  - Cholesterol. Sources of cholesterol include high-fat dairy products and high-fat animal proteins, egg yolks, liver, and other organ meats. Aim for no more than 200 milligrams (mg) of cholesterol a day.  Sodium. Aim for less than 2,300 mg of sodium a day.

## 2018-06-02 NOTE — DISCHARGE NOTE ADULT - OTHER SIGNIFICANT FINDINGS
< from: CT Chest No Cont (05.30.18 @ 20:04) >  FINDINGS:    Lungs:  Multifocal scarring and multiple small calcifications in the   right   lung signify prior granulomatous disease. No cavitation or consolidation.   Tree   in bud opacities in the periphery of the right lower lobe, most   compatible with   infectious bronchiolitis.    Pleural space:  Unremarkable.  No pneumothorax.  No significant   effusion.    Heart:  Unremarkable.  No cardiomegaly.  Nosignificant pericardial   effusion.    Mediastinum:  Esophagus is unremarkable.    Bones/joints:  Chronic appearing endplate compression deformity of the   T4   vertebral body. No acute fracture.  No dislocation.    Soft tissues:  Unremarkable.    Vasculature:  Unremarkable.  No thoracic aortic aneurysm.    Lymph nodes:  Unremarkable.  No enlarged lymph nodes.    IMPRESSION:       1.  Multifocal scarring and multiple small calcifications in the right   lung   signify prior granulomatous disease. No cavitation or consolidation.  2.  Tree in bud opacities in the periphery of the right lower lobe can be   seen   with endobronchial spread of tuberculosis; however, there no cavitary   lesions   to more definitively indicate the presence of reactivation tuberculosis.   More   commonly, tree and opacities will represent viral or bacterial infectious   bronchiolitis. Complete Blood Count + Automated Diff (06.16.18 @ 06:37)    WBC Count: 5.4 K/uL    RBC Count: 4.30 M/uL    Hemoglobin: 12.1 g/dL    Hematocrit: 38.1 %    Mean Cell Volume: 88.6 fl    Mean Cell Hemoglobin: 28.1 pg    Mean Cell Hemoglobin Conc: 31.8 g/dL    Red Cell Distrib Width: 13.8 %    Platelet Count - Automated: 276 K/uL    Auto Neutrophil #: 2.8 K/uL    Auto Lymphocyte #: 1.6 K/uL    Auto Monocyte #: 0.3 K/uL    Auto Eosinophil #: 0.6 K/uL    Auto Basophil #: 0.0 K/uL    Auto Neutrophil %: 52.5: Differential percentages must be correlated with absolute numbers for clinical significance. %    Auto Lymphocyte %: 28.8 %    Auto Monocyte %: 6.3 %    Auto Eosinophil %: 11.8 %    Auto Basophil %: 0.4 %    Comprehensive Metabolic Panel in AM (06.18.18 @ 07:24)    Sodium, Serum: 140 mmol/L    Potassium, Serum: 3.9 mmol/L    Chloride, Serum: 101 mmol/L    Carbon Dioxide, Serum: 26.0 mmol/L    Anion Gap, Serum: 13 mmol/L    Blood Urea Nitrogen, Serum: 20.0 mg/dL    Creatinine, Serum: 0.68 mg/dL    Glucose, Serum: 86 mg/dL    Calcium, Total Serum: 9.3 mg/dL    Protein Total, Serum: 7.5 g/dL    Albumin, Serum: 3.8 g/dL    Bilirubin Total, Serum: 0.2 mg/dL    Alkaline Phosphatase, Serum: 129 U/L    Aspartate Aminotransferase (AST/SGOT): 23 U/L    Alanine Aminotransferase (ALT/SGPT): 22 U/L    eGFR if Non African American: 96 mL/min/1.73M2    eGFR if African American: 111 mL/min/1.73M2    < from: CT Chest No Cont (05.30.18 @ 20:04) >  FINDINGS:    Lungs:  Multifocal scarring and multiple small calcifications in the right lung signify prior granulomatous disease. No cavitation or consolidation. Tree in bud opacities in the periphery of the right lower lobe, most compatible with infectious bronchiolitis.    Pleural space:  Unremarkable.  No pneumothorax.  No significant effusion.    Heart:  Unremarkable.  No cardiomegaly.  No significant pericardial effusion.    Mediastinum:  Esophagus is unremarkable.    Bones/joints:  Chronic appearing endplate compression deformity of the T4 vertebral body. No acute fracture.  No dislocation.    Soft tissues:  Unremarkable.    Vasculature:  Unremarkable.  No thoracic aortic aneurysm.    Lymph nodes:  Unremarkable.  No enlarged lymph nodes.    IMPRESSION:       1.  Multifocal scarring and multiple small calcifications in the right lung signify prior granulomatous disease. No cavitation or consolidation.  2.  Tree in bud opacities in the periphery of the right lower lobe can be seen with endobronchial spread of tuberculosis; however, there no cavitary lesions to more definitively indicate the presence of reactivation tuberculosis. More commonly, tree and opacities will represent viral or bacterial infectious bronchiolitis.    Culture - Acid Fast - Sputum w/Smear . (06.08.18 @ 20:26)    Specimen Source: .Sputum Sputum    Acid Fast Bacilli Smear: No acid fast bacilli seen by fluorochrome stain    Culture Results: No growth at 1 week.    Culture - Acid Fast - Sputum w/Smear . (05.31.18 @ 18:28)    Specimen Source: .Sputum Sputum    Acid Fast Bacilli Smear: No acid fast bacilli seen by fluorochrome stain    Culture Results: No growth at 1 week.    Culture - Acid Fast - Sputum w/Smear . (05.31.18 @ 00:46)    Specimen Source: .Sputum Sputum    Acid Fast Bacilli Smear: No acid fast bacilli seen by fluorochrome stain    Culture Results: No growth at 1 week.    Culture - Acid Fast - Sputum w/Smear . (05.30.18 @ 21:31)    Specimen Source: .Sputum Sputum    Acid Fast Bacilli Smear: No acid fast bacilli seen by fluorochrome stain    Culture Results: No growth at 1 week.    Culture - Acid Fast - Bronchial w/Smear (06.06.18 @ 01:44)    Specimen Source: .Broncial    Acid Fast Bacilli Smear: No acid fast bacilli seen by fluorochrome stain    Culture Results: No growth at 1 week.    Culture - Bronchial (06.05.18 @ 14:55)    Gram Stain: Few White blood cells  No organisms seen    Specimen Source: Broncial Bronchial Lavage    Culture Results: Numerous Haemophilus parainfluenzae Beta lactamase negative  Rare Routine respiratory negro present    Organism Identification: Haemophilus parainfluenzae  Organism: Haemophilus parainfluenzae

## 2018-06-02 NOTE — PROGRESS NOTE ADULT - SUBJECTIVE AND OBJECTIVE BOX
CC: Patient is a 57y old  Female who presents with a chief complaint of cough, positive QuantiFeron test and abnormal cxr (29 May 2018 22:08)    Patient seen and examined at bedside, No acute overnight events.  Patient ambulating, eating well, voiding and last BM_.  Cardiac monitor reviewed;    ROS: Denies fever, chest pain, SOB, abdominal pain, diarrhea, constipation, calf pain.    VS:   Vital Signs Last 24 Hrs  T(C): 36.5 (02 Jun 2018 05:23), Max: 36.5 (01 Jun 2018 09:20)  T(F): 97.7 (02 Jun 2018 05:23), Max: 97.7 (01 Jun 2018 09:20)  HR: 63 (02 Jun 2018 05:23) (63 - 70)  BP: 121/81 (02 Jun 2018 05:23) (118/80 - 144/85)  BP(mean): --  RR: 18 (02 Jun 2018 05:23) (14 - 18)  SpO2: 98% (01 Jun 2018 22:16) (98% - 98%)    Physical Exam:   Gen: NAD  HEENT: NCAT, EOMI, PERRLA, Pupils_  CVS: RRR, +S1/S2, no murmurs, rubs or gallops appreciated  Pulm: CTAB, no wheeze, rales, rhonchi  GI: +BS, soft, ND, NT. no palpable flank tenderness or mass, no CVA tenderness  Ext: No cyanosis, edema or calf tenderness  Neuro: AAOx3, no focal deficits.    Labs:                        13.3   5.4   )-----------( 230      ( 01 Jun 2018 06:59 )             42.3   06-01    141  |  100  |  22.0<H>  ----------------------------<  87  4.5   |  30.0<H>  |  0.63    Ca    9.4      01 Jun 2018 06:59    TPro  7.3  /  Alb  4.0  /  TBili  0.7  /  DBili  x   /  AST  15  /  ALT  16  /  AlkPhos  118  05-31 05-31 @ 07:01  -  06-01 @ 07:00  --------------------------------------------------------  IN: 240 mL / OUT: 500 mL / NET: -260 mL    06-01 @ 07:01  - 06-02 @ 06:31  --------------------------------------------------------  IN: 200 mL / OUT: 0 mL / NET: 200 mL        Radiology:  *Pull    Medications:  MEDICATIONS  (STANDING):  enoxaparin Injectable 40 milliGRAM(s) SubCutaneous daily  fluticasone propionate 50 MICROgram(s)/spray Nasal Spray 1 Spray(s) Both Nostrils two times a day    MEDICATIONS  (PRN):  acetaminophen   Tablet. 650 milliGRAM(s) Oral every 6 hours PRN Mild Pain (1 - 3)  ALBUTerol/ipratropium for Nebulization 3 milliLiter(s) Nebulizer every 6 hours PRN Shortness of Breath and/or Wheezing  naproxen 250 milliGRAM(s) Oral every 12 hours PRN Pain CC: Patient is a 57y old  Female who presents with a chief complaint of cough, positive QuantiFeron test and abnormal cxr (29 May 2018 22:08)    Patient seen and examined at bedside, No acute overnight events.  Patient ambulating, eating well, voiding and last BM yesterday.     This morning patient denies any current medical complaints. Is inquiring about when she can go home. Wants to know how much longer she will be in the hospital for.  Denies fever, chills, chest pain, SOB, abdominal pain, diarrhea, constipation, calf pain.    VS:   Vital Signs Last 24 Hrs  T(C): 36.5 (02 Jun 2018 05:23), Max: 36.5 (01 Jun 2018 09:20)  T(F): 97.7 (02 Jun 2018 05:23), Max: 97.7 (01 Jun 2018 09:20)  HR: 63 (02 Jun 2018 05:23) (63 - 70)  BP: 121/81 (02 Jun 2018 05:23) (118/80 - 144/85)  BP(mean): --  RR: 18 (02 Jun 2018 05:23) (14 - 18)  SpO2: 98% (01 Jun 2018 22:16) (98% - 98%)    Physical Exam:   Gen: NAD  HEENT: NCAT, EOMI, PERRLA, Pupils 5mm to 4mm, moist mucous membranes  CVS: RRR, +S1/S2, no murmurs, rubs or gallops appreciated  Pulm: CTAB, no wheeze, rales, rhonchi  GI: +BS, soft, ND, NT. no palpable flank tenderness or mass, no CVA tenderness  Ext: No cyanosis, edema or calf tenderness. 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   Neuro: AAOx3, no focal deficits.    Labs:                        13.3   5.4   )-----------( 230      ( 01 Jun 2018 06:59 )             42.3   06-01    141  |  100  |  22.0<H>  ----------------------------<  87  4.5   |  30.0<H>  |  0.63    Ca    9.4      01 Jun 2018 06:59    TPro  7.3  /  Alb  4.0  /  TBili  0.7  /  DBili  x   /  AST  15  /  ALT  16  /  AlkPhos  118  05-31 05-31 @ 07:01 - 06-01 @ 07:00  --------------------------------------------------------  IN: 240 mL / OUT: 500 mL / NET: -260 mL    06-01 @ 07:01 - 06-02 @ 06:31  --------------------------------------------------------  IN: 200 mL / OUT: 0 mL / NET: 200 mL          Medications:  MEDICATIONS  (STANDING):  enoxaparin Injectable 40 milliGRAM(s) SubCutaneous daily  fluticasone propionate 50 MICROgram(s)/spray Nasal Spray 1 Spray(s) Both Nostrils two times a day    MEDICATIONS  (PRN):  acetaminophen   Tablet. 650 milliGRAM(s) Oral every 6 hours PRN Mild Pain (1 - 3)  ALBUTerol/ipratropium for Nebulization 3 milliLiter(s) Nebulizer every 6 hours PRN Shortness of Breath and/or Wheezing  naproxen 250 milliGRAM(s) Oral every 12 hours PRN Pain CC: Patient is a 57y old  Female who presents with a chief complaint of cough, positive QuantiFeron test and abnormal cxr (29 May 2018 22:08)    Overnight/ AM events: Faroese INT used   Patient seen and examined at bedside, No acute overnight events. Patient reports chronic cough, no phlegm and no other symptoms. Patient is tearful and wants to go home. D/w her that patient needs clearance from ID and GUNJAN. She understands that she will be hospitalized until cleared. Patient agreeable for bronchoscopy if needed.   Patient ambulating, eating well, voiding and last BM yesterday.     This morning patient denies any current medical complaints. Is inquiring about when she can go home. Wants to know how much longer she will be in the hospital for.  Denies fever, chills, chest pain, SOB, abdominal pain, diarrhea, constipation, calf pain.    VS:   Vital Signs Last 24 Hrs  T(C): 36.5 (02 Jun 2018 05:23), Max: 36.5 (01 Jun 2018 09:20)  T(F): 97.7 (02 Jun 2018 05:23), Max: 97.7 (01 Jun 2018 09:20)  HR: 63 (02 Jun 2018 05:23) (63 - 70)  BP: 121/81 (02 Jun 2018 05:23) (118/80 - 144/85)  BP(mean): --  RR: 18 (02 Jun 2018 05:23) (14 - 18)  SpO2: 98% (01 Jun 2018 22:16) (98% - 98%)    Physical Exam:   Gen: NAD  HEENT: NCAT, EOMI, PERRLA, Pupils 5mm to 4mm, moist mucous membranes  CVS: RRR, +S1/S2, no murmurs, rubs or gallops appreciated  Pulm: CTAB, no wheeze, rales, rhonchi  GI: +BS, soft, ND, NT. no palpable flank tenderness or mass, no CVA tenderness  Ext: No cyanosis, edema or calf tenderness. 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   Neuro: AAOx3, no focal deficits.    Labs:                        13.3   5.4   )-----------( 230      ( 01 Jun 2018 06:59 )             42.3   06-01    141  |  100  |  22.0<H>  ----------------------------<  87  4.5   |  30.0<H>  |  0.63    Ca    9.4      01 Jun 2018 06:59    TPro  7.3  /  Alb  4.0  /  TBili  0.7  /  DBili  x   /  AST  15  /  ALT  16  /  AlkPhos  118  05-31 05-31 @ 07:01  -  06-01 @ 07:00  --------------------------------------------------------  IN: 240 mL / OUT: 500 mL / NET: -260 mL    06-01 @ 07:01  -  06-02 @ 06:31  --------------------------------------------------------  IN: 200 mL / OUT: 0 mL / NET: 200 mL          Medications:  MEDICATIONS  (STANDING):  enoxaparin Injectable 40 milliGRAM(s) SubCutaneous daily  fluticasone propionate 50 MICROgram(s)/spray Nasal Spray 1 Spray(s) Both Nostrils two times a day    MEDICATIONS  (PRN):  acetaminophen   Tablet. 650 milliGRAM(s) Oral every 6 hours PRN Mild Pain (1 - 3)  ALBUTerol/ipratropium for Nebulization 3 milliLiter(s) Nebulizer every 6 hours PRN Shortness of Breath and/or Wheezing  naproxen 250 milliGRAM(s) Oral every 12 hours PRN Pain

## 2018-06-02 NOTE — DISCHARGE NOTE ADULT - CARE PROVIDER_API CALL
[facility],   [address] [place] [address]  Phone: [phone]  Fax: (   )    - [facility],   [address] [place] [address]  Phone: [phone]  Fax: (   )    -    Paty Callahan), Infectious Disease; Internal Medicine  [address] [place] [address]  Phone: [phone]  Fax: [phone]

## 2018-06-02 NOTE — DISCHARGE NOTE ADULT - ADDITIONAL INSTRUCTIONS
Patient has outpatient appointment with ID scheduled for ...  Patient has outpatient appointment with [facility] clinic scheduled for.... Patient has to make outpatient appointment with ID  Patient has outpatient appointment with [facility] clinic scheduled for 6/21/18 3:00pm Patient has to make outpatient appointment with ID  Patient has outpatient appointment with [facility] clinic scheduled for 6/21/18 3:00pm with Dr Moshe Torres

## 2018-06-02 NOTE — DISCHARGE NOTE ADULT - CARE PLAN
Principal Discharge DX:	Tuberculosis  Assessment and plan of treatment:	s/p treatment with RIPE for 2 weeks;     Patient will follow up with pcp at [facility] clinic on ...  Patient will follow up with ID on ...    meaghan sure that your family, friends, and the people you work with are tested.    Avoid close contact with others until your healthcare provider says it is OK.    Keep your hands clean. Be sure to wash them every time you use them to cover your mouth when you cough.    When you cough or sneeze, take steps to prevent the spread of TB:    Cover your mouth and nose with a tissue.    Put your used tissue in a closed bag and throw it away.    If you don't have a tissue, cough or sneeze into your upper sleeve or elbow, not your hands.    Wash your hands often with soap and warm water for 20 seconds. If soap and water are not available, use an alcohol-based hand gel.  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  Assessment and plan of treatment:	hx positive quantiferon gold test;  Secondary Diagnosis:	Hyperlipidemia, unspecified hyperlipidemia type  Assessment and plan of treatment:	-pt is not taking any medications at this time  -c/w lifestyle modifications diet and exercise Principal Discharge DX:	Tuberculosis  Goal:	Completion of treatment  Assessment and plan of treatment:	s/p treatment with RIPE for 2 weeks;     Patient will follow up with PCP at [facility] clinic on 06/21/2018  Patient will follow up with ID on   Make sure that your family, friends, and the people you work with are tested.  Avoid close contact with others until your healthcare provider says it is OK.  Keep your hands clean. Be sure to wash them every time you use them to cover your mouth when you cough.  When you cough or sneeze, take steps to prevent the spread of TB:  Cover your mouth and nose with a tissue.  Put your used tissue in a closed bag and throw it away.  If you don't have a tissue, cough or sneeze into your upper sleeve or elbow, not your hands.  Wash your hands often with soap and warm water for 20 seconds. If soap and water are not available, use an alcohol-based hand gel.  Secondary Diagnosis:	Transaminitis  Goal:	resolving;  Assessment and plan of treatment:	Watch from any numbness or tingling, or abdominal pain while on tb medications;  Secondary Diagnosis:	Hyperlipidemia, unspecified hyperlipidemia type  Assessment and plan of treatment:	-Patient is not taking any medications at this time  -c/w lifestyle modifications diet and exercise as described above. Principal Discharge DX:	Tuberculosis  Goal:	Completion of treatment  Assessment and plan of treatment:	s/p treatment with RIPE for 2 weeks;     Patient will follow up with PCP at [facility] clinic on 06/21/2018 and will meet with GUNJAN to witness her taking dose of medication   Patient will follow up with ID   Make sure that your family, friends, and the people you work with are tested.  Avoid close contact with others until your healthcare provider says it is OK.  Keep your hands clean. Be sure to wash them every time you use them to cover your mouth when you cough.  When you cough or sneeze, take steps to prevent the spread of TB:  Cover your mouth and nose with a tissue.  Put your used tissue in a closed bag and throw it away.  If you don't have a tissue, cough or sneeze into your upper sleeve or elbow, not your hands.  Wash your hands often with soap and warm water for 20 seconds. If soap and water are not available, use an alcohol-based hand gel.  Secondary Diagnosis:	Transaminitis  Goal:	resolving;  Assessment and plan of treatment:	Watch from any numbness or tingling, or abdominal pain while on tb medications;  Secondary Diagnosis:	Hyperlipidemia, unspecified hyperlipidemia type  Assessment and plan of treatment:	-Patient is not taking any medications at this time  -c/w lifestyle modifications diet and exercise as described above. Principal Discharge DX:	Tuberculosis  Goal:	Completion of treatment  Assessment and plan of treatment:	s/p treatment with RIPE for 2 weeks;     Uric acid serum increase, likely secondary to treatment, will repeat level at [facility]  Patient will follow up with PCP at [facility] clinic on 06/21/2018 and will meet with GUNJAN to witness her taking dose of medication   Patient will follow up with ID   Make sure that your family, friends, and the people you work with are tested.  Avoid close contact with others until your healthcare provider says it is OK.  Keep your hands clean. Be sure to wash them every time you use them to cover your mouth when you cough.  When you cough or sneeze, take steps to prevent the spread of TB:  Cover your mouth and nose with a tissue.  Put your used tissue in a closed bag and throw it away.  If you don't have a tissue, cough or sneeze into your upper sleeve or elbow, not your hands.  Wash your hands often with soap and warm water for 20 seconds. If soap and water are not available, use an alcohol-based hand gel.  Secondary Diagnosis:	Transaminitis  Goal:	resolving;  Assessment and plan of treatment:	Watch from any numbness or tingling, or abdominal pain while on tb medications;  Secondary Diagnosis:	Hyperlipidemia, unspecified hyperlipidemia type  Assessment and plan of treatment:	-Patient is not taking any medications at this time  -c/w lifestyle modifications diet and exercise as described above. Principal Discharge DX:	Tuberculosis  Goal:	Completion of treatment  Assessment and plan of treatment:	s/p treatment with RIPE for 2 weeks with the last day being on 6/18;     Uric acid serum increased from 6 to 8.2, likely secondary to treatment, will repeat level at [facility] on 6/21   Patient will follow up with PCP at [facility] clinic on 06/21/2018 and will meet with GUNJAN to witness her taking dose of medication. On the day of the appointment, please do not take your medications. Bring them with you to the clinic and the GUNJAN will be there to assist you with further care   Patient will follow up with ID   Make sure that your family, friends, and the people you work with are tested.  Avoid close contact with others until your healthcare provider says it is OK.  Keep your hands clean. Be sure to wash them every time you use them to cover your mouth when you cough.  When you cough or sneeze, take steps to prevent the spread of TB:  Cover your mouth and nose with a tissue.  Put your used tissue in a closed bag and throw it away.  If you don't have a tissue, cough or sneeze into your upper sleeve or elbow, not your hands.  Wash your hands often with soap and warm water for 20 seconds. If soap and water are not available, use an alcohol-based hand gel.  Secondary Diagnosis:	Transaminitis  Goal:	resolving;  Assessment and plan of treatment:	Watch from any numbness or tingling, or abdominal pain while on tb medications;  repeated and with no significant change. Will need to follow up clinic  Secondary Diagnosis:	Hyperlipidemia, unspecified hyperlipidemia type  Assessment and plan of treatment:	-Patient is not taking any medications at this time  -c/w lifestyle modifications diet and exercise as described above.

## 2018-06-02 NOTE — DISCHARGE NOTE ADULT - COMMUNITY RESOURCES
FOLLOW-UP APPOINTMENT SCHEDULED: THURSDAY, JUNE 21, 2018 AT 3:00PM WITH DR. DANNY GONSALVES, [facility], [address], [place] [address].

## 2018-06-03 PROCEDURE — 99232 SBSQ HOSP IP/OBS MODERATE 35: CPT | Mod: GC

## 2018-06-03 RX ORDER — LORATADINE 10 MG/1
10 TABLET ORAL DAILY
Qty: 0 | Refills: 0 | Status: DISCONTINUED | OUTPATIENT
Start: 2018-06-03 | End: 2018-06-18

## 2018-06-03 RX ADMIN — ENOXAPARIN SODIUM 40 MILLIGRAM(S): 100 INJECTION SUBCUTANEOUS at 12:27

## 2018-06-03 RX ADMIN — PANTOPRAZOLE SODIUM 40 MILLIGRAM(S): 20 TABLET, DELAYED RELEASE ORAL at 05:46

## 2018-06-03 RX ADMIN — Medication 1 SPRAY(S): at 05:46

## 2018-06-03 RX ADMIN — LORATADINE 10 MILLIGRAM(S): 10 TABLET ORAL at 17:39

## 2018-06-03 RX ADMIN — Medication 1 SPRAY(S): at 17:39

## 2018-06-03 NOTE — PROGRESS NOTE ADULT - SUBJECTIVE AND OBJECTIVE BOX
CC: Patient is a 57y old  Female who presents with a chief complaint of cough, positive QuantiFeron test and abnormal cxr, admitted to rule out active tuberculosis (02 Jun 2018 16:32)    Patient seen and examined at bedside, No acute overnight events.  Patient this AM complaining of mild b/l HA as well as leg pain b/l when she tries to ambulate, improved from previous. Patient ambulating, eating well, voiding and last BM yesterday.    ROS: Denies fever, chest pain, SOB, abdominal pain, diarrhea, constipation, isolated calf pain.    VS:   Vital Signs Last 24 Hrs  T(C): 36.7 (03 Jun 2018 05:39), Max: 36.7 (03 Jun 2018 05:39)  T(F): 98 (03 Jun 2018 05:39), Max: 98 (03 Jun 2018 05:39)  HR: 65 (03 Jun 2018 05:39) (60 - 65)  BP: 115/76 (03 Jun 2018 05:39) (112/80 - 120/83)  RR: 18 (03 Jun 2018 05:39) (16 - 18)  SpO2: 99% (02 Jun 2018 20:41) (99% - 99%)      Physical Exam:   Gen: NAD  HEENT: NCAT, EOMI, PERRLA  CVS: RRR, +S1/S2, no murmurs, rubs or gallops appreciated  Lungs: CTAB, no wheeze, rales, rhonchi  Abdomen: +BS, soft, ND, NT. no palpable flank tenderness or mass, no CVA tenderness  Ext: No cyanosis, edema or calf tenderness  Neuro: AAOx3, no focal deficits.    Labs:                        13.6   6.6   )-----------( 271      ( 02 Jun 2018 07:15 )             43.5   06-02    138  |  99  |  19.0  ----------------------------<  95  4.4   |  27.0  |  0.61    Ca    9.7      02 Jun 2018 07:15    Rapid HIV-1/2 Antibody (05.31.18 @ 06:36)    Rapid HIV-1/2 Antibody: Nonreact      Radiology:  < from: CT Chest No Cont (05.30.18 @ 20:04) >  IMPRESSION:       1.  Multifocal scarring and multiple small calcifications in the right   lung   signify prior granulomatous disease. No cavitation or consolidation.  2.  Tree in bud opacities in the periphery of the right lower lobe can be   seen   with endobronchial spread of tuberculosis; however, there no cavitary   lesions   to more definitively indicate the presence of reactivation tuberculosis.   More   commonly, tree and opacities will represent viral or bacterial infectious   bronchiolitis.      Medications:  MEDICATIONS  (STANDING):  enoxaparin Injectable 40 milliGRAM(s) SubCutaneous daily  fluticasone propionate 50 MICROgram(s)/spray Nasal Spray 1 Spray(s) Both Nostrils two times a day  pantoprazole    Tablet 40 milliGRAM(s) Oral before breakfast    MEDICATIONS  (PRN):  acetaminophen   Tablet. 650 milliGRAM(s) Oral every 6 hours PRN Mild Pain (1 - 3)  ALBUTerol/ipratropium for Nebulization 3 milliLiter(s) Nebulizer every 6 hours PRN Shortness of Breath and/or Wheezing  naproxen 250 milliGRAM(s) Oral every 12 hours PRN Pain  traMADol 25 milliGRAM(s) Oral every 8 hours PRN Moderate Pain (4 - 6) CC: Patient is a 57y old  Female who presents with a chief complaint of cough, positive QuantiFeron test and abnormal cxr, admitted to rule out active tuberculosis (02 Jun 2018 16:32)    Overnight/ AM events:  Patient seen and examined at bedside, No acute overnight events.  Patient this AM complaining of mild b/l HA as well as leg pain b/l when she tries to ambulate, improved from previous. Daughter at bedside and requested information in regards to hospitalization and anticipated discharge. D/w her that will need to discuss with the GUNJAN and ID in the am and upon further discussion dispo will be determined at that time. Also unclear if further intervention with bronch needed. Patient and daughter understand the plan of care. Patient ambulating, eating well, voiding and last BM yesterday.    ROS: Denies fever, chest pain, SOB, abdominal pain, diarrhea, constipation, isolated calf pain.    VS:   Vital Signs Last 24 Hrs  T(C): 36.7 (03 Jun 2018 05:39), Max: 36.7 (03 Jun 2018 05:39)  T(F): 98 (03 Jun 2018 05:39), Max: 98 (03 Jun 2018 05:39)  HR: 65 (03 Jun 2018 05:39) (60 - 65)  BP: 115/76 (03 Jun 2018 05:39) (112/80 - 120/83)  RR: 18 (03 Jun 2018 05:39) (16 - 18)  SpO2: 99% (02 Jun 2018 20:41) (99% - 99%)      Physical Exam:   Gen: NAD  HEENT: NCAT, EOMI, PERRLA  CVS: RRR, +S1/S2, no murmurs, rubs or gallops appreciated  Lungs: CTAB, no wheeze, rales, rhonchi  Abdomen: +BS, soft, ND, NT. no palpable flank tenderness or mass, no CVA tenderness  Ext: No cyanosis, edema or calf tenderness  Neuro: AAOx3, no focal deficits.    Labs:                        13.6   6.6   )-----------( 271      ( 02 Jun 2018 07:15 )             43.5   06-02    138  |  99  |  19.0  ----------------------------<  95  4.4   |  27.0  |  0.61    Ca    9.7      02 Jun 2018 07:15    Rapid HIV-1/2 Antibody (05.31.18 @ 06:36)    Rapid HIV-1/2 Antibody: Nonreact      Radiology:  < from: CT Chest No Cont (05.30.18 @ 20:04) >  IMPRESSION:       1.  Multifocal scarring and multiple small calcifications in the right   lung   signify prior granulomatous disease. No cavitation or consolidation.  2.  Tree in bud opacities in the periphery of the right lower lobe can be   seen   with endobronchial spread of tuberculosis; however, there no cavitary   lesions   to more definitively indicate the presence of reactivation tuberculosis.   More   commonly, tree and opacities will represent viral or bacterial infectious   bronchiolitis.      Medications:  MEDICATIONS  (STANDING):  enoxaparin Injectable 40 milliGRAM(s) SubCutaneous daily  fluticasone propionate 50 MICROgram(s)/spray Nasal Spray 1 Spray(s) Both Nostrils two times a day  pantoprazole    Tablet 40 milliGRAM(s) Oral before breakfast    MEDICATIONS  (PRN):  acetaminophen   Tablet. 650 milliGRAM(s) Oral every 6 hours PRN Mild Pain (1 - 3)  ALBUTerol/ipratropium for Nebulization 3 milliLiter(s) Nebulizer every 6 hours PRN Shortness of Breath and/or Wheezing  naproxen 250 milliGRAM(s) Oral every 12 hours PRN Pain  traMADol 25 milliGRAM(s) Oral every 8 hours PRN Moderate Pain (4 - 6)

## 2018-06-03 NOTE — PROGRESS NOTE ADULT - ASSESSMENT
57 y.o. F with hx of HLD and previous alcohol abuse for about 30 yrs ( quit 1 yr ago ), sent from [facility] clinic due to chronic cough associated with sputum production, positive Quantiferon test and hx of TB exposure in the past and prior CXR with calcifications/lesions over Right upper lobe.  Pt will be admitted due to high risk for active TB. S/p AFB negative x 3. ID continues to follow the patient and CT sx consulted for possible bronchoscopy for BAL AFB smear.     1- QuantiFeron positive with RUL lesions/ calcifications and hx TB exposure and concern for active TB infections vs latent TB, high risk for TB   -pt with complaints of chronic cough no other complaints at this time   -c/w airborne precautions   -CT chest with multi focal scarring and multiple small calcifications in the right lung signify prior granulomatous disease. No cavitation or consolidation. Tree in bud opacities in the periphery of the right lower lobe showing changes suggestive of previous granulomatous disease, no clear indication of active TB, may represent bronchiolitis  -sputum culture x 3 collected, no AFB stains noted  -ID consulted noted and appreciated and recommended  CT surgery and Pulm for bronchoscopy and BAL to be sent for AFB smear, culture and PCR if AFB smear negative x 3   - Pulm consult noted and appreciated recommended bronchoscopy by thoracic if advised by ID per [facility] guidelines  - Ct surgery f/u noted and appreciated and wants to d/w ID in regards to need for BAL for AFB smear   -GUNJAN aware of pt and will be contacted again on monday for further management     2-HLD  -pt is not taking any medications at this time  -Lipid panel done last week wnl  -c/w lifestyle modifications diet and exercise     3- Preventive measure   -DVT proph: Lovenox 40 mg sq daily 57 y.o. F with hx of HLD and previous alcohol abuse for about 30 yrs ( quit 1 yr ago ), sent from [facility] clinic due to chronic cough associated with sputum production, positive Quantiferon test and hx of TB exposure in the past and prior CXR with calcifications/lesions over Right upper lobe.  Pt was admitted due to high risk for active TB. S/p AFB negative x 3. ID continues to follow the patient and CT sx consulted for possible bronchoscopy for BAL - AFB smear.     1- QuantiFeron positive with RUL lesions/ calcifications and hx TB exposure and concern for active TB infections vs latent TB, high risk for TB   -pt with complaints of chronic cough no other complaints at this time   -c/w airborne precautions   -CT chest with multi focal scarring and multiple small calcifications in the right lung signify prior granulomatous disease. No cavitation or consolidation. Tree in bud opacities in the periphery of the right lower lobe showing changes suggestive of previous granulomatous disease, no clear indication of active TB, may represent bronchiolitis  -sputum culture x 3 collected, no AFB stains noted  -ID consulted noted and appreciated and recommended  CT surgery and Pulm for bronchoscopy and BAL to be sent for AFB smear, culture and PCR if AFB smear negative x 3. ID follow up requested and will d/w ID and GUNJAN in regards to the plan of care and if bronchoscopy needed.   - Pulm consult noted and appreciated recommended bronchoscopy by thoracic if advised by ID per GUNJAN guidelines  - Ct surgery f/u noted and appreciated and wants to d/w ID in regards to need for BAL for AFB smear   -GUNJAN aware of pt and will be contacted again on monday for further management     2-HLD  -pt is not taking any medications at this time  -Lipid panel done last week wnl  -c/w lifestyle modifications diet and exercise     3- Preventive measure   -DVT proph: Lovenox 40 mg sq daily

## 2018-06-04 ENCOUNTER — TRANSCRIPTION ENCOUNTER (OUTPATIENT)
Age: 58
End: 2018-06-04

## 2018-06-04 PROBLEM — Z00.00 ENCOUNTER FOR PREVENTIVE HEALTH EXAMINATION: Status: ACTIVE | Noted: 2018-06-04

## 2018-06-04 LAB
ABO RH CONFIRMATION: SIGNIFICANT CHANGE UP
ANION GAP SERPL CALC-SCNC: 11 MMOL/L — SIGNIFICANT CHANGE UP (ref 5–17)
APTT BLD: 44 SEC — HIGH (ref 27.5–37.4)
BASOPHILS # BLD AUTO: 0 K/UL — SIGNIFICANT CHANGE UP (ref 0–0.2)
BASOPHILS NFR BLD AUTO: 0.3 % — SIGNIFICANT CHANGE UP (ref 0–2)
BLD GP AB SCN SERPL QL: SIGNIFICANT CHANGE UP
BUN SERPL-MCNC: 29 MG/DL — HIGH (ref 8–20)
CALCIUM SERPL-MCNC: 9.6 MG/DL — SIGNIFICANT CHANGE UP (ref 8.6–10.2)
CHLORIDE SERPL-SCNC: 103 MMOL/L — SIGNIFICANT CHANGE UP (ref 98–107)
CO2 SERPL-SCNC: 28 MMOL/L — SIGNIFICANT CHANGE UP (ref 22–29)
CREAT SERPL-MCNC: 0.8 MG/DL — SIGNIFICANT CHANGE UP (ref 0.5–1.3)
EOSINOPHIL # BLD AUTO: 0.7 K/UL — HIGH (ref 0–0.5)
EOSINOPHIL NFR BLD AUTO: 11.6 % — HIGH (ref 0–6)
GLUCOSE SERPL-MCNC: 93 MG/DL — SIGNIFICANT CHANGE UP (ref 70–115)
HCT VFR BLD CALC: 39.5 % — SIGNIFICANT CHANGE UP (ref 37–47)
HGB BLD-MCNC: 12.2 G/DL — SIGNIFICANT CHANGE UP (ref 12–16)
INR BLD: 0.97 RATIO — SIGNIFICANT CHANGE UP (ref 0.88–1.16)
LYMPHOCYTES # BLD AUTO: 1.9 K/UL — SIGNIFICANT CHANGE UP (ref 1–4.8)
LYMPHOCYTES # BLD AUTO: 32 % — SIGNIFICANT CHANGE UP (ref 20–55)
MCHC RBC-ENTMCNC: 27.7 PG — SIGNIFICANT CHANGE UP (ref 27–31)
MCHC RBC-ENTMCNC: 30.9 G/DL — LOW (ref 32–36)
MCV RBC AUTO: 89.8 FL — SIGNIFICANT CHANGE UP (ref 81–99)
MONOCYTES # BLD AUTO: 0.4 K/UL — SIGNIFICANT CHANGE UP (ref 0–0.8)
MONOCYTES NFR BLD AUTO: 6.1 % — SIGNIFICANT CHANGE UP (ref 3–10)
NEUTROPHILS # BLD AUTO: 3 K/UL — SIGNIFICANT CHANGE UP (ref 1.8–8)
NEUTROPHILS NFR BLD AUTO: 49.3 % — SIGNIFICANT CHANGE UP (ref 37–73)
PLATELET # BLD AUTO: 221 K/UL — SIGNIFICANT CHANGE UP (ref 150–400)
POTASSIUM SERPL-MCNC: 4.4 MMOL/L — SIGNIFICANT CHANGE UP (ref 3.5–5.3)
POTASSIUM SERPL-SCNC: 4.4 MMOL/L — SIGNIFICANT CHANGE UP (ref 3.5–5.3)
PROTHROM AB SERPL-ACNC: 10.7 SEC — SIGNIFICANT CHANGE UP (ref 9.8–12.7)
RBC # BLD: 4.4 M/UL — SIGNIFICANT CHANGE UP (ref 4.4–5.2)
RBC # FLD: 14.2 % — SIGNIFICANT CHANGE UP (ref 11–15.6)
SODIUM SERPL-SCNC: 142 MMOL/L — SIGNIFICANT CHANGE UP (ref 135–145)
TYPE + AB SCN PNL BLD: SIGNIFICANT CHANGE UP
WBC # BLD: 6 K/UL — SIGNIFICANT CHANGE UP (ref 4.8–10.8)
WBC # FLD AUTO: 6 K/UL — SIGNIFICANT CHANGE UP (ref 4.8–10.8)

## 2018-06-04 PROCEDURE — 99232 SBSQ HOSP IP/OBS MODERATE 35: CPT

## 2018-06-04 PROCEDURE — 99232 SBSQ HOSP IP/OBS MODERATE 35: CPT | Mod: GC

## 2018-06-04 RX ADMIN — ENOXAPARIN SODIUM 40 MILLIGRAM(S): 100 INJECTION SUBCUTANEOUS at 11:56

## 2018-06-04 RX ADMIN — LORATADINE 10 MILLIGRAM(S): 10 TABLET ORAL at 11:56

## 2018-06-04 RX ADMIN — PANTOPRAZOLE SODIUM 40 MILLIGRAM(S): 20 TABLET, DELAYED RELEASE ORAL at 06:07

## 2018-06-04 RX ADMIN — Medication 1 SPRAY(S): at 18:08

## 2018-06-04 RX ADMIN — Medication 1 SPRAY(S): at 06:07

## 2018-06-04 NOTE — DIETITIAN INITIAL EVALUATION ADULT. - OTHER INFO
Spoke to pt's daughter at bedside via phone, reports pt is eating well, 100% at meals, pt has not had any weight changes. Food preferences obtained.

## 2018-06-04 NOTE — PROGRESS NOTE ADULT - ASSESSMENT
56 y/o woman with PMH of HLD and alcohol abuse in the past was admitted with positive QuantiFeron test, cough and sputum for about 2 years with some weight loss and night sweats.   She had contact with possible TB cases about 15-20 years ago.   TB is a possibility but sometimes old infection causing granulomas/scars as well.   HIV neg. CT suggestive of scars of old infection.  Even though AFBx3 sputua negative but she is very high risk for TB.     1-R/O TB:  -Sputum AFB smear x 3 neg, follow up the cultures.   -No empiric treatment with IRPE at this time.   - bronchoscopy and BAL to be sent for AFB smear, culture and PCR.     Please note the following articles:   1-1-s2.0-[id number]....pdf <attachment.ashx?attach=1&id=[id number]%1sL9RpDNwaFvH1uEHPNoZ4JxIMkWXgO7t%1rZ6ZbFXed0WtZ7MOZR&attid0=[id number]&attcnt=1>?  2-https://bmcinfectdis.biomedcentral.com/articles/10.1186/1712-0880- <redir.aspx?REF=53gnjUqjOHyONJe7xuE2VTRR6ZNXuWaC5nCHJqDtT63iuBKaLJxGFJEelOMxtjypT4VxsRVxVgVnf9KktQSfy0Wit8ngnZ5nn79entLzbTHiF1C6cBX1dUTqW4GaP0OwR3hvTzRvhUKjwk9zhU8qAUIhQY63epJkErVmzZ4mtjFhZ0ksr15hPC8bJRx7VvK1TbRxNeUfRrA6YHOPCJCcOaZcFZCnNB4Tq19JXZjhWq05jKJaH7waeLR3hWpfFZ73QXf9SX6uVyO6KrxksvBDgzEHYA93FJzeUHp8pzK7BKXrX2oZRYI3wkLRmeyzIGQ6HIiyWVRnVWwhpuUQVSRyI1Z4exYeA0IgubXzXVySZURORKRkAVW5V9fuN2BTT6V8S8pcdp7Hr3kEMqI6GxbbFlkiK40JXGGqF4niS3CMWvOvewwOKVIktGL8WuaWMy8AYtL3>  3-https://www.ncbi.nlm.nih.gov/pmc/articles/FNR4828583/pdf/12070_2008_Article_BF02991474.pdf <redir.aspx?POR=ee2xTU8xH9U4GfjQ9IA1eKLG5X097ZdmfXJYfdPjegMopWHuIKrYLERopAQpulwfP0IsuXJbSeQcu5JcpTIab4Edl4mthM5tq49qprQjlRUoT4L0dAK8aXBoB4UaJ2a5ev0oI0HhVa0ogL3pqUqoK633L5UnW05werPkD8fel56UXMQxJRUkUzL8M5OxBy4dHiV7BS56PdLaYOikVCCTjbIfC4nzLDNPDdJdOwy9PMC7NO8mZJQoLG5Mx72SRXemFu06xWNnH1miuYC0vChvIC36UUh5NQ9jJzO8YkqcsuCUmvEHHC06TCxcZGc5vnN4LQKoA5kRDFV3mcXAxkisEJS6ESmoUUJzTOrzwkAZIKCsP9J2urJyQ6NoyjZaHBxEKSRFDZNsBBD8K8ndZ1CKU4M5Z2vyly59CVQ1hS8RFQvvYLINJxratRY7T8d0PmpBHpoRiANhZN5HreP0CBltIK8DJxV1>  4-http://[email or web address] [email or web address].[email or web address]/home/index.php/archive/135-volume-05-rzavf-00-crglviik-2017/1700-the-role-of-bronchoscopy-in-the-diagnosis-of-sputum-smear-negative-pulmonary-tuberculosis <redir.aspx?REF=P5X2Zd_3LQyp-VETe5d9Bkl8M7kWEn7DqpFeiYhvoUTcrUFiJExOFSGcjKAphmvuA3OcsUDyXzEyt1EpsYXoc8Sjz9nanO1dz04dhpFcaFNnJ7S2rBK0hF2fRE1gov5yL7MxfDgzbITthCrgAJBjj67kf7AhL6krwMSusE8lDPxpgTxtK0PcY0mjqsMjWRA1DIGGhn7wpV1yCPTIOTSeZsUpi1M0TI0rSROrBAKASjRlksPzhdspVzTySID5LsU9IKNqCtR7zGDnTqHxc6yzONLFu7UtRgVyua3lG2wek8NsgTdqIzRxtd7zRWSwKN9pYUKmRKtem6Ikyn5vXO0aQKIQv7U5bSCkMHLXf52gZIToGdAuJNwuhLd0NM3cUIZ2sP3dreFvrW6xTMP8WkBvZ9Npl4MngzMaSJF6SLDVhbJeQMMqUC09D7i2NitjNIRQZ1nRAcKJVHpspm1wSNF7OrG1AUL0QsVZjA3Hm9T7PRJXgkbj>   5-http://medind.isaac.in/iae/t08/i1/[email or web address].pdf <redir.aspx?REF=ZJhdsjoBAsjjADZ2DAVdzP5hTirJNUBCMAARZkUfh3LehBLvRNtBAZPsrHMgibctX3LxeEIdSrHtt6ItrGSwr8Fob3xvoO4cd61yznBvyMBsM3J9sZI5fV2uVP6acIMdzZ7lGl2vRj7jtt8uGGQneQW8Z3vbV7lfVHSbHNuquTG1EcRoSnYjROU9GHPLeqEvNSSqCQ42Q8y7DzmiVLXWT5yXCyFKHGvsxz7pQBZ4RcX9MBY1GzUFgR7Yq5J2SZEQwgdhHRhJRYs6WTJpmiX2OgQ6nWTQDt18K7dsZJMMLayAORW2XDRiXfJfvDuKnHdVIRE1PUsXJoqsU7SKMWQJITyRHsLbXD6wbLHNUgmyNHiVjmkorHMoacX2BQTabXExHwZbj2wxG50XYM7dUGGUBZBkKI9.>  6-https://www.ncbi.nlm.nih.gov/pmc/articles/XHB1121154/ <redir.aspx?XAM=TcW4M6EIHx21opideMRjO9PeWWQYq2cN8BIIvCbTBEQetZYrJTeNEFTslVSrofbyT4DndLBzYkRos3PeqTPdp0Tvn6kmiM4ch01pbfGzfXMkS8B4yBS3gJScU6QpT2v1px0bL2WfSa8yeN7edPtkQ115D9UeW32xcjIaI3mxh80LUTDrCWeaGWY2SnViAVU4STUNnjZfMQRbOH66J6b1ClfvAOMXP7lCYcMLUOeimv8cDNV5OrH1QWK6MqKWcE1Bv1D0DLGCnwmmOLmEVVd2EADnhrQ5YxG5kINSQk27Q2dkFZVNRnfGGRC7CILeVkUspGsEqMcQSAI4GLdTOxtgG2DOBDWPVQkRSaYyZFD0VwVONDBwDtY7mZQktJgVSrYVbpRRs0Y5yZD1dBq4NpLhL9GBPvIrCEqmAJ0.>  7-https://journal.chestnet.org/article/-1204(28)20468-9/fulltext

## 2018-06-04 NOTE — PROGRESS NOTE ADULT - SUBJECTIVE AND OBJECTIVE BOX
CC: Patient is a 57y old  Female who presents with a chief complaint of cough, positive QuantiFeron test and abnormal cxr, admitted to rule out active tuberculosis (02 Jun 2018 16:32)    Overnight/ AM events:  Patient seen and examined at bedside, No acute overnight events.  Patient this AM denies any complains, she only reports intermittent occasional productive cough, she states that she wants her saline lock removed and will refuse daily blood work from now on. Daughter at bedside and requested information in regards to hospitalization and anticipated discharge. D/w her that will need to discuss with the GUNJAN and ID in the am and upon further discussion disposition will be determined at that time. Also unclear if further intervention with bronch needed. Patient and daughter understand the plan of care. Patient ambulating, eating well, voiding and last BM yesterday.    ROS: Denies fever, chest pain, SOB, abdominal pain, diarrhea, constipation, isolated calf pain.    Vital Signs Last 24 Hrs  T(C): 36.7 (04 Jun 2018 05:03), Max: 36.9 (03 Jun 2018 20:35)  T(F): 98 (04 Jun 2018 05:03), Max: 98.4 (03 Jun 2018 20:35)  HR: 80 (04 Jun 2018 05:03) (70 - 80)  BP: 119/78 (04 Jun 2018 05:03) (114/79 - 131/83)  BP(mean): --  RR: 18 (04 Jun 2018 05:03) (16 - 18)  SpO2: 97% (04 Jun 2018 05:03) (96% - 98%)    Physical Exam:   Gen: NAD  HEENT: NCAT, EOMI, PERRLA  CVS: RRR, +S1/S2, no murmurs, rubs or gallops appreciated  Lungs: Normal respiratory rate and effort, mild crackles noted on upper lobes bilaterally, no wheeze, rales, rhonchi  Abdomen: +BS, soft, ND, NT. No palpable flank tenderness or mass, no CVA tenderness  Ext: No cyanosis, edema or calf tenderness  Neuro: AAOx3, no focal deficits.    Labs:                        12.2   6.0   )-----------( 221      ( 04 Jun 2018 06:57 )             39.5   06-04    142  |  103  |  29.0<H>  ----------------------------<  93  4.4   |  28.0  |  0.80    Ca    9.6      04 Jun 2018 06:57      MEDICATIONS  (STANDING):  enoxaparin Injectable 40 milliGRAM(s) SubCutaneous daily  fluticasone propionate 50 MICROgram(s)/spray Nasal Spray 1 Spray(s) Both Nostrils two times a day  loratadine 10 milliGRAM(s) Oral daily  pantoprazole    Tablet 40 milliGRAM(s) Oral before breakfast    MEDICATIONS  (PRN):  acetaminophen   Tablet. 650 milliGRAM(s) Oral every 6 hours PRN Mild Pain (1 - 3)  ALBUTerol/ipratropium for Nebulization 3 milliLiter(s) Nebulizer every 6 hours PRN Shortness of Breath and/or Wheezing  traMADol 25 milliGRAM(s) Oral every 8 hours PRN Moderate Pain (4 - 6) CC: Patient is a 57y old  Female who presents with a chief complaint of cough, positive QuantiFeron test and abnormal cxr, admitted to rule out active tuberculosis (02 Jun 2018 16:32)    Overnight/ AM events:  Patient seen and examined at bedside, No acute overnight events.  Patient this AM denies any complains, she only reports intermittent occasional productive cough, she states that she wants her saline lock removed and will refuse daily blood work from now on. Daughter at bedside and requested information in regards to hospitalization and anticipated discharge. D/w her that pt will need bronch to determine further plan of care. Patient and daughter understand the plan of care and agree to move forward with bronch. Patient ambulating, eating well, voiding and last BM yesterday.    ROS: Denies fever, chest pain, SOB, abdominal pain, diarrhea, constipation, isolated calf pain.    Vital Signs Last 24 Hrs  T(C): 36.7 (04 Jun 2018 05:03), Max: 36.9 (03 Jun 2018 20:35)  T(F): 98 (04 Jun 2018 05:03), Max: 98.4 (03 Jun 2018 20:35)  HR: 80 (04 Jun 2018 05:03) (70 - 80)  BP: 119/78 (04 Jun 2018 05:03) (114/79 - 131/83)  BP(mean): --  RR: 18 (04 Jun 2018 05:03) (16 - 18)  SpO2: 97% (04 Jun 2018 05:03) (96% - 98%)    Physical Exam:   Gen: NAD  HEENT: NCAT, EOMI, PERRLA  CVS: RRR, +S1/S2, no murmurs, rubs or gallops appreciated  Lungs: Normal respiratory rate and effort, mild crackles noted on upper lobes bilaterally, no wheeze, rales, rhonchi  Abdomen: +BS, soft, ND, NT. No palpable flank tenderness or mass, no CVA tenderness  Ext: No cyanosis, edema or calf tenderness  Neuro: AAOx3, no focal deficits.    Labs:                        12.2   6.0   )-----------( 221      ( 04 Jun 2018 06:57 )             39.5   06-04    142  |  103  |  29.0<H>  ----------------------------<  93  4.4   |  28.0  |  0.80    Ca    9.6      04 Jun 2018 06:57      MEDICATIONS  (STANDING):  enoxaparin Injectable 40 milliGRAM(s) SubCutaneous daily  fluticasone propionate 50 MICROgram(s)/spray Nasal Spray 1 Spray(s) Both Nostrils two times a day  loratadine 10 milliGRAM(s) Oral daily  pantoprazole    Tablet 40 milliGRAM(s) Oral before breakfast    MEDICATIONS  (PRN):  acetaminophen   Tablet. 650 milliGRAM(s) Oral every 6 hours PRN Mild Pain (1 - 3)  ALBUTerol/ipratropium for Nebulization 3 milliLiter(s) Nebulizer every 6 hours PRN Shortness of Breath and/or Wheezing  traMADol 25 milliGRAM(s) Oral every 8 hours PRN Moderate Pain (4 - 6)

## 2018-06-04 NOTE — PROGRESS NOTE ADULT - ASSESSMENT
57 y.o. F with hx of HLD and previous alcohol abuse for about 30 yrs ( quit 1 yr ago ), sent from [facility] clinic due to chronic cough associated with sputum production, positive Quantiferon test and hx of TB exposure in the past and prior CXR with calcifications/lesions over Right upper lobe.  Pt was admitted due to high risk for active TB. S/p AFB negative x 3. ID continues to follow the patient and CT sx consulted for possible bronchoscopy for BAL - AFB smear.     1- QuantiFeron positive with RUL lesions/ calcifications and hx TB exposure and concern for active TB infections vs latent TB, high risk for TB   -pt with complaints of chronic cough no other complaints at this time   -c/w airborne precautions   -CT chest with multi focal scarring and multiple small calcifications in the right lung signify prior granulomatous disease. No cavitation or consolidation. Tree in bud opacities in the periphery of the right lower lobe showing changes suggestive of previous granulomatous disease, no clear indication of active TB, may represent bronchiolitis  -sputum culture x 3 collected, no AFB stains noted  -ID consulted noted and appreciated and recommended  CT surgery and Pulm for bronchoscopy and BAL to be sent for AFB smear, culture and PCR if AFB smear negative x 3. ID follow up requested and will d/w ID and GUNJAN in regards to the plan of care and if bronchoscopy needed.   - Pulm consult noted and appreciated recommended bronchoscopy by thoracic if advised by ID per GUNJAN guidelines  - Ct surgery f/u noted and appreciated and wants to d/w ID in regards to need for BAL for AFB smear   -GUNJAN aware of pt and will be contacted again on monday for further management     2-HLD  -pt is not taking any medications at this time  -Lipid panel done last week wnl  -c/w lifestyle modifications diet and exercise     3- Preventive measure   -DVT proph: Lovenox 40 mg sq daily 57 y.o. F with hx of HLD and previous alcohol abuse for about 30 yrs ( quit 1 yr ago ), sent from [facility] clinic due to chronic cough associated with sputum production, positive Quantiferon test and hx of TB exposure in the past and prior CXR with calcifications/lesions over right upper lobe.  Pt was admitted due to high risk for active TB. S/p AFB negative x 3. ID continues to follow the patient and CT sx consulted for bronchoscopy for BAL - AFB smear to be done in the am.     1- QuantiFeron positive with RUL lesions/ calcifications and hx TB exposure and concern for active TB infections vs latent TB, high risk for TB   -pt with complaints of chronic cough no other complaints at this time   -c/w airborne precautions   -CT chest with multi focal scarring and multiple small calcifications in the right lung signify prior granulomatous disease. No cavitation or consolidation. Tree in bud opacities in the periphery of the right lower lobe showing changes suggestive of previous granulomatous disease, no clear indication of active TB, may represent bronchiolitis  -sputum culture x 3 collected, no AFB stains noted  -npo at midnight and bronchoscopy in the am   -ID f/u noted and appreciated and recommended CT surgery and Pulm for bronchoscopy and BAL to be sent for AFB smear, culture and PCR.  - Pulm consult noted and appreciated    - Ct surgery f/u noted and appreciated      2-HLD  -pt is not taking any medications at this time  -Lipid panel done last week wnl  -c/w lifestyle modifications diet and exercise     3- Preventive measure   -DVT proph: Lovenox 40 mg sq daily

## 2018-06-04 NOTE — PROGRESS NOTE ADULT - SUBJECTIVE AND OBJECTIVE BOX
The patient is a 57y old female who presents with a complaint of chronic cough for 1 year with   positive QuantiFeron test and abnormal CXR.    She is scheduled to have a FLEX   BRONCHOSCOPY  AND LAVAGE.  (02 Jun 2018 16:32)      PAST MEDICAL HISTORY:  Hyperlipidemia  Alcohol abuse - She drank alcohol daily x 36 years and quit 9 months ago.      PAST SURGICAL HISTORY:  Left inguinal hernia repair      MEDICATIONS  (STANDING):  fluticasone propionate 50 MICROgram(s)/spray Nasal Spray 1 Spray(s) Both Nostrils two times a day  loratadine 10 milliGRAM(s) Oral daily  pantoprazole    Tablet 40 milliGRAM(s) Oral before breakfast    MEDICATIONS  (PRN):  acetaminophen   Tablet. 650 milliGRAM(s) Oral every 6 hours PRN Mild Pain (1 - 3)  ALBUTerol/ipratropium for Nebulization 3 milliLiter(s) Nebulizer every 6 hours PRN Shortness of Breath and/or Wheezing  traMADol 25 milliGRAM(s) Oral every 8 hours PRN Moderate Pain (4 - 6)      Allergies:     No Known Drug Allergies      SOCIAL HISTORY:    She drank alcohol daily for 36 years and quit 9 months ago.  She did not                               smoke or use illicit drugs.                      12.2   6.0   )-----------( 221      ( 04 Jun 2018 06:57 )             39.5     PT/INR - ( 04 Jun 2018 13:18 )   PT: 10.7 sec;   INR: 0.97 ratio       PTT - ( 04 Jun 2018 13:18 )  PTT:44.0 sec    06-04    142  |  103  |  29.0<H>  ----------------------------<  93  4.4   |  28.0  |  0.80    Ca    9.6      04 Jun 2018 06:57    EKG:  =  None    TT ECHO:  None    CHEST X-RAY  -  5/29/2018  No evidence of cardiopulmonary disease.     CT CHEST - 5/30/2018  IMPRESSION:       1.  Multifocal scarring and multiple small calcifications in the right lung   signify prior granulomatous disease. No cavitation or consolidation.  2.  Tree in bud opacities in the periphery of the right lower lobe can be seen   with endobronchial spread of tuberculosis; however, there no cavitary lesions   to more definitively indicate the presence of reactivation tuberculosis.   More commonly, tree and opacities will represent viral or bacterial infectious   bronchiolitis.    ASA # = 3 Mallampati # = 3  (She has upper and lower dentures.)

## 2018-06-04 NOTE — DIETITIAN INITIAL EVALUATION ADULT. - PERTINENT LABORATORY DATA
06-04 Na142 mmol/L Glu 93 mg/dL K+ 4.4 mmol/L Cr  0.80 mg/dL BUN 29.0 mg/dL<H> Phos n/a   Alb n/a   PAB n/a

## 2018-06-04 NOTE — PROGRESS NOTE ADULT - SUBJECTIVE AND OBJECTIVE BOX
[facility]  INFECTIOUS DISEASES AND INTERNAL MEDICINE at [place]  =======================================================  Ha Callahan MD  Diplomates American Board of Internal Medicine and Infectious Diseases  =======================================================    ERIC DONOVAN [id number]    Follow up:      56 y/o woman with PMH of HLD and alcohol abuse in the past was referred by [facility] clinic for possible TB infection. She has cough with yellow/green sputum, night sweats and weight loss in last 2 years.    She lives in [facility] and vising her kids in [place] to [place] on 5/4 and planning to go back in 1-2 months. 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 Never had PPD test in the past.   Three of her family members tested positives with QuantiFeron test and they were already treated for 9motnhs.   No new complaint today.     PAST MEDICAL & SURGICAL HISTORY:  Hyperlipidemia  Alcohol abuse stopped one year ago  S/P hernia repair    Social Hx: No smoking or drugs but abused alcohol for more than 20 years and stopped one year ago.     FAMILY HISTORY:  No pertinent family history in first degree relatives    Allergies  No Known Allergies    Antibiotics:  None      REVIEW OF SYSTEMS:  CONSTITUTIONAL:  No Fever or chills, + weight loss   HEENT:  No diplopia or blurred vision.  No earache, sore throat, + Runny nose  CARDIOVASCULAR:  No chest pain or SOB.  RESPIRATORY:  + cough, No SOB, +sputum   GI:  No nausea, vomiting or diarrhea.  GENITOURINARY:  No dysuria, frequency or urgency. No Blood in urine  MUSCULOSKELETAL:  no joint aches, no muscle pain  SKIN:  No change in skin, hair or nails.  NEUROLOGIC:  No paresthesias, fasciculations, seizures or weakness.  PSYCHIATRIC:  No disorder of thought or mood.  ENDOCRINE:  No heat or cold intolerance, polyuria or polydipsia.  HEMATOLOGICAL:  No easy bruising or bleeding.     Physical Exam:  Vital Signs Last 24 Hrs  T(C): 36.6 (04 Jun 2018 16:23), Max: 36.9 (03 Jun 2018 20:35)  T(F): 97.8 (04 Jun 2018 16:23), Max: 98.4 (03 Jun 2018 20:35)  HR: 53 (04 Jun 2018 16:23) (53 - 88)  BP: 152/67 (04 Jun 2018 16:23) (114/79 - 152/67)  RR: 19 (04 Jun 2018 16:23) (16 - 19)  SpO2: 97% (04 Jun 2018 05:03) (97% - 97%)  HEENT: normocephalic and atraumatic. EOMI. PERRL.    NECK: Supple.  No lymphadenopathy   LUNGS: scattered coarse rhonchi bilaterally  HEART: Regular rate and rhythm without murmur.  ABDOMEN: Soft, nontender, and nondistended.  Positive bowel sounds.    : No CVA tenderness  EXTREMITIES: Without any cyanosis, clubbing, rash, lesions or edema.   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  PSYCHIATRIC: Appropriate affect .  SKIN: no lesion or rash     Labs:  06-04    142  |  103  |  29.0<H>  ----------------------------<  93  4.4   |  28.0  |  0.80    Ca    9.6      04 Jun 2018 06:57                          12.2   6.0   )-----------( 221      ( 04 Jun 2018 06:57 )             39.5     PT/INR - ( 04 Jun 2018 13:18 )   PT: 10.7 sec;   INR: 0.97 ratio    PTT - ( 04 Jun 2018 13:18 )  PTT:44.0 sec    RECENT CULTURES:  05-31 @ 18:28 .Sputum Sputum       05-31 @ 00:46 .Sputum Sputum       05-30 @ 21:31 .Sputum Sputum       All imaging and data are reviewed.   Chest CT:  IMPRESSION:       1.  Multifocal scarring and multiple small calcifications in the right lung   signify prior granulomatous disease. No cavitation or consolidation.  2.  Tree in bud opacities in the periphery of the right lower lobe can be   seen with endobronchial spread of tuberculosis; however, there no cavitary   lesions to more definitively indicate the presence of reactivation tuberculosis.   More commonly, tree and opacities will represent viral or bacterial infectious   bronchiolitis.

## 2018-06-05 ENCOUNTER — APPOINTMENT (OUTPATIENT)
Dept: THORACIC SURGERY | Facility: HOSPITAL | Age: 58
End: 2018-06-05
Payer: MEDICAID

## 2018-06-05 ENCOUNTER — RESULT REVIEW (OUTPATIENT)
Age: 58
End: 2018-06-05

## 2018-06-05 PROBLEM — E78.5 HYPERLIPIDEMIA, UNSPECIFIED: Chronic | Status: ACTIVE | Noted: 2018-05-29

## 2018-06-05 PROBLEM — F10.10 ALCOHOL ABUSE, UNCOMPLICATED: Chronic | Status: ACTIVE | Noted: 2018-05-29

## 2018-06-05 LAB
GRAM STN FLD: SIGNIFICANT CHANGE UP
SPECIMEN SOURCE: SIGNIFICANT CHANGE UP

## 2018-06-05 PROCEDURE — 31624 DX BRONCHOSCOPE/LAVAGE: CPT

## 2018-06-05 PROCEDURE — 99232 SBSQ HOSP IP/OBS MODERATE 35: CPT

## 2018-06-05 PROCEDURE — 88112 CYTOPATH CELL ENHANCE TECH: CPT | Mod: 26

## 2018-06-05 PROCEDURE — 99233 SBSQ HOSP IP/OBS HIGH 50: CPT | Mod: GC

## 2018-06-05 PROCEDURE — 88305 TISSUE EXAM BY PATHOLOGIST: CPT | Mod: 26

## 2018-06-05 RX ORDER — PYRAZINAMIDE 500 MG/1
1500 TABLET ORAL DAILY
Qty: 0 | Refills: 0 | Status: DISCONTINUED | OUTPATIENT
Start: 2018-06-05 | End: 2018-06-18

## 2018-06-05 RX ORDER — HEXAVITAMINS
300 TABLET ORAL DAILY
Qty: 0 | Refills: 0 | Status: DISCONTINUED | OUTPATIENT
Start: 2018-06-05 | End: 2018-06-18

## 2018-06-05 RX ORDER — ETHAMBUTOL HYDROCHLORIDE 400 MG/1
1200 TABLET, FILM COATED ORAL DAILY
Qty: 0 | Refills: 0 | Status: DISCONTINUED | OUTPATIENT
Start: 2018-06-05 | End: 2018-06-18

## 2018-06-05 RX ORDER — PYRIDOXINE HCL (VITAMIN B6) 100 MG
50 TABLET ORAL DAILY
Qty: 0 | Refills: 0 | Status: DISCONTINUED | OUTPATIENT
Start: 2018-06-05 | End: 2018-06-13

## 2018-06-05 RX ADMIN — ETHAMBUTOL HYDROCHLORIDE 1200 MILLIGRAM(S): 400 TABLET, FILM COATED ORAL at 17:47

## 2018-06-05 RX ADMIN — Medication 50 MILLIGRAM(S): at 17:48

## 2018-06-05 RX ADMIN — Medication 1 SPRAY(S): at 05:35

## 2018-06-05 RX ADMIN — Medication 1 SPRAY(S): at 17:48

## 2018-06-05 RX ADMIN — LORATADINE 10 MILLIGRAM(S): 10 TABLET ORAL at 17:48

## 2018-06-05 RX ADMIN — Medication 300 MILLIGRAM(S): at 17:48

## 2018-06-05 RX ADMIN — PYRAZINAMIDE 1500 MILLIGRAM(S): 500 TABLET ORAL at 17:48

## 2018-06-05 NOTE — PROGRESS NOTE ADULT - SUBJECTIVE AND OBJECTIVE BOX
[facility]  INFECTIOUS DISEASES AND INTERNAL MEDICINE at [place]  =======================================================  Ha Callahan MD  Diplomates American Board of Internal Medicine and Infectious Diseases  =======================================================    ERIC DONOVAN [id number]    Follow up:  58 y/o woman with PMH of HLD and alcohol abuse in the past was referred by [facility] clinic for possible TB infection. She has cough with yellow/green sputum, night sweats and weight loss in last 2 years.    She lives in [facility] and vising her kids in [place]4 and planning to go back in 1-2 months. 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 Never had PPD test in the past.   Three of her family members tested positives with QuantiFeron test and they were already treated for 9motnhs.   No new complaint today when I saw her this morning. S/P bronchoscopy today.     PAST MEDICAL & SURGICAL HISTORY:  Hyperlipidemia  Alcohol abuse stopped one year ago  S/P hernia repair    Social Hx: No smoking or drugs but abused alcohol for more than 20 years and stopped one year ago.     FAMILY HISTORY:  No pertinent family history in first degree relatives    Allergies  No Known Allergies    Antibiotics:  None      REVIEW OF SYSTEMS:  CONSTITUTIONAL:  No Fever or chills, + weight loss   HEENT:  No diplopia or blurred vision.  No earache, sore throat, + Runny nose  CARDIOVASCULAR:  No chest pain or SOB.  RESPIRATORY:  + cough, No SOB, +sputum   GI:  No nausea, vomiting or diarrhea.  GENITOURINARY:  No dysuria, frequency or urgency. No Blood in urine  MUSCULOSKELETAL:  no joint aches, no muscle pain  SKIN:  No change in skin, hair or nails.  NEUROLOGIC:  No paresthesias, fasciculations, seizures or weakness.  PSYCHIATRIC:  No disorder of thought or mood.  ENDOCRINE:  No heat or cold intolerance, polyuria or polydipsia.  HEMATOLOGICAL:  No easy bruising or bleeding.     Physical Exam:  Vital Signs Last 24 Hrs  T(C): 36.6 (04 Jun 2018 16:23), Max: 36.9 (03 Jun 2018 20:35)  T(F): 97.8 (04 Jun 2018 16:23), Max: 98.4 (03 Jun 2018 20:35)  HR: 53 (04 Jun 2018 16:23) (53 - 88)  BP: 152/67 (04 Jun 2018 16:23) (114/79 - 152/67)  RR: 19 (04 Jun 2018 16:23) (16 - 19)  SpO2: 97% (04 Jun 2018 05:03) (97% - 97%)  HEENT: normocephalic and atraumatic. EOMI. PERRL.    NECK: Supple.  No lymphadenopathy   LUNGS: scattered coarse rhonchi bilaterally  HEART: Regular rate and rhythm without murmur.  ABDOMEN: Soft, nontender, and nondistended.  Positive bowel sounds.    : No CVA tenderness  EXTREMITIES: Without any cyanosis, clubbing, rash, lesions or edema.   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  PSYCHIATRIC: Appropriate affect .  SKIN: no lesion or rash         Labs:  06-04    142  |  103  |  29.0<H>  ----------------------------<  93  4.4   |  28.0  |  0.80    Ca    9.6      04 Jun 2018 06:57                        12.2   6.0   )-----------( 221      ( 04 Jun 2018 06:57 )             39.5     PT/INR - ( 04 Jun 2018 13:18 )   PT: 10.7 sec;   INR: 0.97 ratio    PTT - ( 04 Jun 2018 13:18 )  PTT:44.0 sec    RECENT CULTURES:  05-31 @ 18:28 .Sputum Sputum       05-31 @ 00:46 .Sputum Sputum       05-30 @ 21:31 .Sputum Sputum       All imaging and data are reviewed.

## 2018-06-05 NOTE — PROGRESS NOTE ADULT - ASSESSMENT
56 y/o woman with PMH of HLD and alcohol abuse in the past was admitted with positive QuantiFeron test, cough and sputum for about 2 years with some weight loss and night sweats.   She had contact with possible TB cases about 15-20 years ago.   TB is a possibility but sometimes old infection causing granulomas/scars as well.   HIV neg. CT suggestive of scars of old infection.  Even though AFBx3 sputua negative but she is very high risk for TB.   today had bronch and BAL specimen sent for culture and AFB and PCR.      1-R/O TB:  -Sputum AFB smear x 3 neg, follow up the cultures.   -Will start her on 4 drug TB regimen until we have the PCR and culture result back, if all work up neg. can convert treatment to LTBI treatment   -Started on INH 300mg daily, Rifampin 600mg daily, Pyrazinamide 1500mg daily, Ethambutol 1200mg daily, Vit B6 50mg daily.   Meds s/e especially hepatitis and neuropathy with TB meds.    -Will need uric acid and ophthalmology exam for baseline and after that as needed.   -Follow up clinically for hepatitis and repeat LFTs as needed.

## 2018-06-05 NOTE — PROGRESS NOTE ADULT - SUBJECTIVE AND OBJECTIVE BOX
CC: Patient is a 57y old  Female who presents with a chief complaint of cough, positive QuantiFeron test and abnormal cxr, admitted to rule out active tuberculosis (02 Jun 2018 16:32)    Overnight/ AM events:  Patient seen and examined at bedside, No acute overnight events.  Patient this AM denies any complains at the moment, stated that she slept comfortably overnight and states she is optimistic about her disease process.  ROS: Denies fever, chest pain, SOB, abdominal pain, diarrhea, constipation, isolated calf pain.    Vital Signs Last 24 Hrs  T(C): 36.7 (05 Jun 2018 05:30), Max: 36.7 (04 Jun 2018 10:40)  T(F): 98.1 (05 Jun 2018 05:30), Max: 98.1 (05 Jun 2018 05:30)  HR: 73 (05 Jun 2018 05:30) (53 - 88)  BP: 116/77 (05 Jun 2018 05:30) (116/77 - 152/67)  BP(mean): --  RR: 18 (05 Jun 2018 05:30) (18 - 19)  SpO2: 95% (04 Jun 2018 20:55) (95% - 95%)    Physical Exam:   Gen: No acute distress.  HEENT: Normocephalic, atraumatic, EOMI, PERRL.  CVS: Regular rate and rhythm, +S1/S2, no murmurs, rubs or gallops appreciated  Lungs: Normal respiratory rate and effort, mild crackles noted on upper lobes bilaterally, no wheeze, rales, rhonchi  Abdomen: Normal bowel sounds, soft, nontender, nondistended. No palpable flank tenderness or mass, no CVA tenderness  Ext: No cyanosis, edema or calf tenderness  Neuro: AAOx3, no focal deficits.    No interval labs    MEDICATIONS  (STANDING):  fluticasone propionate 50 MICROgram(s)/spray Nasal Spray 1 Spray(s) Both Nostrils two times a day  loratadine 10 milliGRAM(s) Oral daily  pantoprazole    Tablet 40 milliGRAM(s) Oral before breakfast    MEDICATIONS  (PRN):  acetaminophen   Tablet. 650 milliGRAM(s) Oral every 6 hours PRN Mild Pain (1 - 3)  ALBUTerol/ipratropium for Nebulization 3 milliLiter(s) Nebulizer every 6 hours PRN Shortness of Breath and/or Wheezing  traMADol 25 milliGRAM(s) Oral every 8 hours PRN Moderate Pain (4 - 6) CC: Patient is a 57y old  Female who presents with a chief complaint of cough, positive QuantiFeron test and abnormal cxr, admitted to rule out active tuberculosis (02 Jun 2018 16:32)    Overnight/ AM events:  Patient seen and examined at bedside, No acute overnight events.  Patient this AM denies any complains at the moment, stated that she slept comfortably overnight and states she is optimistic about her disease process. Understands she is going for bronchoscopy today and a plan of care after the bronchoscopy will be determined in regards to her disposition and course of treatment.     ROS: Denies fever, chest pain, SOB, abdominal pain, diarrhea, constipation, isolated calf pain.    Vital Signs Last 24 Hrs  T(C): 36.7 (05 Jun 2018 05:30), Max: 36.7 (04 Jun 2018 10:40)  T(F): 98.1 (05 Jun 2018 05:30), Max: 98.1 (05 Jun 2018 05:30)  HR: 73 (05 Jun 2018 05:30) (53 - 88)  BP: 116/77 (05 Jun 2018 05:30) (116/77 - 152/67)  BP(mean): --  RR: 18 (05 Jun 2018 05:30) (18 - 19)  SpO2: 95% (04 Jun 2018 20:55) (95% - 95%)    Physical Exam:   Gen: No acute distress.  HEENT: Normocephalic, atraumatic, EOMI, PERRL.  CVS: Regular rate and rhythm, +S1/S2, no murmurs, rubs or gallops appreciated  Lungs: Normal respiratory rate and effort, mild crackles noted on upper lobes bilaterally, no wheeze, rales, rhonchi  Abdomen: Normal bowel sounds, soft, nontender, nondistended. No palpable flank tenderness or mass, no CVA tenderness  Ext: No cyanosis, edema or calf tenderness  Neuro: AAOx3, no focal deficits.    No interval labs    MEDICATIONS  (STANDING):  fluticasone propionate 50 MICROgram(s)/spray Nasal Spray 1 Spray(s) Both Nostrils two times a day  loratadine 10 milliGRAM(s) Oral daily  pantoprazole    Tablet 40 milliGRAM(s) Oral before breakfast    MEDICATIONS  (PRN):  acetaminophen   Tablet. 650 milliGRAM(s) Oral every 6 hours PRN Mild Pain (1 - 3)  ALBUTerol/ipratropium for Nebulization 3 milliLiter(s) Nebulizer every 6 hours PRN Shortness of Breath and/or Wheezing  traMADol 25 milliGRAM(s) Oral every 8 hours PRN Moderate Pain (4 - 6)

## 2018-06-05 NOTE — PROGRESS NOTE ADULT - ASSESSMENT
57 y.o. F with hx of HLD and previous alcohol abuse for about 30 yrs ( quit 1 yr ago ), sent from [facility] clinic due to chronic cough associated with sputum production, positive Quantiferon test and hx of TB exposure in the past and prior CXR with calcifications/lesions over right upper lobe.  Pt was admitted due to high risk for active TB. S/p AFB negative x 3. ID continues to follow the patient and CT sx consulted for bronchoscopy for BAL - AFB smear to be done today.     1- QuantiFeron positive with RUL lesions/ calcifications and hx TB exposure and concern for active TB infections vs latent TB, high risk for TB   -pt with complaints of chronic cough no other complaints at this time   -c/w airborne precautions   -CT chest with multi focal scarring and multiple small calcifications in the right lung signify prior granulomatous disease. No cavitation or consolidation. Tree in bud opacities in the periphery of the right lower lobe showing changes suggestive of previous granulomatous disease, no clear indication of active TB, may represent bronchiolitis  -sputum culture x 3 collected, no AFB stains noted  -npo at midnight and bronchoscopy in the am   -ID f/u noted and appreciated and recommended CT surgery and Pulm for bronchoscopy and BAL to be sent for AFB smear, culture and PCR.  - Pulm consult noted and appreciated    - Ct surgery f/u noted and appreciated      2-HLD  -pt is not taking any medications at this time  -Lipid panel done last week wnl  -c/w lifestyle modifications diet and exercise     3- Preventive measure   -DVT proph: Lovenox 40 mg sq daily 57 y.o. F with hx of HLD and previous alcohol abuse for about 30 yrs ( quit 1 yr ago ), sent from [facility] clinic due to chronic cough associated with sputum production, positive Quantiferon test and hx of TB exposure in the past and prior CXR with calcifications/lesions over right upper lobe.  Pt was admitted due to high risk for active TB. S/p AFB negative x 3. ID continues to follow the patient and CT sx consulted for bronchoscopy for BAL - AFB smear to be done today.     1- QuantiFeron positive with RUL lesions/ calcifications and hx TB exposure and concern for active TB infections vs latent TB, high risk for TB   - pt with complaints of chronic cough no other complaints at this time   - c/w airborne precautions   - CT chest with multi focal scarring and multiple small calcifications in the right lung signify prior granulomatous disease. No cavitation or consolidation. Tree in bud opacities in the periphery of the right lower lobe showing changes suggestive of previous granulomatous disease, no clear indication of active TB, may represent bronchiolitis  - sputum culture x 3 collected, no AFB stains noted  - s/p bronchoscopy for BAL today awaiting results of AFB smear, culture and PCR  - ID f/u noted and appreciated and d/w GUNJAN pt to remain on isolation and start TB medications x 14 days prior to discharge   - Pulm consult noted and appreciated    - Ct surgery f/u noted and appreciated      2-HLD  -pt is not taking any medications at this time  -Lipid panel done last week wnl  -c/w lifestyle modifications diet and exercise   -DASH diet     3- Preventive measure   -DVT proph:   -d/c lovenox for bronch today, but pt is ambulating   -no need for chemical AC     Dispo: Patient to remain inpatient for 14 days on TB tx, thereafter can be discharged to home.

## 2018-06-05 NOTE — BRIEF OPERATIVE NOTE - PROCEDURE
<<-----Click on this checkbox to enter Procedure Bronchoalveolar lavage  06/05/2018    Active  GALYATEEM  Flexible bronchoscopy  06/05/2018    Active  GALYATEEM

## 2018-06-06 LAB
ANION GAP SERPL CALC-SCNC: 12 MMOL/L — SIGNIFICANT CHANGE UP (ref 5–17)
BASOPHILS # BLD AUTO: 0 K/UL — SIGNIFICANT CHANGE UP (ref 0–0.2)
BASOPHILS NFR BLD AUTO: 0.5 % — SIGNIFICANT CHANGE UP (ref 0–2)
BUN SERPL-MCNC: 24 MG/DL — HIGH (ref 8–20)
CALCIUM SERPL-MCNC: 9.1 MG/DL — SIGNIFICANT CHANGE UP (ref 8.6–10.2)
CHLORIDE SERPL-SCNC: 102 MMOL/L — SIGNIFICANT CHANGE UP (ref 98–107)
CO2 SERPL-SCNC: 26 MMOL/L — SIGNIFICANT CHANGE UP (ref 22–29)
CREAT SERPL-MCNC: 0.63 MG/DL — SIGNIFICANT CHANGE UP (ref 0.5–1.3)
EOSINOPHIL # BLD AUTO: 0.7 K/UL — HIGH (ref 0–0.5)
EOSINOPHIL NFR BLD AUTO: 10.1 % — HIGH (ref 0–6)
GLUCOSE SERPL-MCNC: 90 MG/DL — SIGNIFICANT CHANGE UP (ref 70–115)
HCT VFR BLD CALC: 38.5 % — SIGNIFICANT CHANGE UP (ref 37–47)
HGB BLD-MCNC: 12 G/DL — SIGNIFICANT CHANGE UP (ref 12–16)
LYMPHOCYTES # BLD AUTO: 1.7 K/UL — SIGNIFICANT CHANGE UP (ref 1–4.8)
LYMPHOCYTES # BLD AUTO: 25.5 % — SIGNIFICANT CHANGE UP (ref 20–55)
MCHC RBC-ENTMCNC: 27.9 PG — SIGNIFICANT CHANGE UP (ref 27–31)
MCHC RBC-ENTMCNC: 31.2 G/DL — LOW (ref 32–36)
MCV RBC AUTO: 89.5 FL — SIGNIFICANT CHANGE UP (ref 81–99)
MONOCYTES # BLD AUTO: 0.4 K/UL — SIGNIFICANT CHANGE UP (ref 0–0.8)
MONOCYTES NFR BLD AUTO: 6.3 % — SIGNIFICANT CHANGE UP (ref 3–10)
NEUTROPHILS # BLD AUTO: 3.8 K/UL — SIGNIFICANT CHANGE UP (ref 1.8–8)
NEUTROPHILS NFR BLD AUTO: 56.8 % — SIGNIFICANT CHANGE UP (ref 37–73)
NIGHT BLUE STAIN TISS: SIGNIFICANT CHANGE UP
PLATELET # BLD AUTO: 204 K/UL — SIGNIFICANT CHANGE UP (ref 150–400)
POTASSIUM SERPL-MCNC: 4 MMOL/L — SIGNIFICANT CHANGE UP (ref 3.5–5.3)
POTASSIUM SERPL-SCNC: 4 MMOL/L — SIGNIFICANT CHANGE UP (ref 3.5–5.3)
RBC # BLD: 4.3 M/UL — LOW (ref 4.4–5.2)
RBC # FLD: 14.3 % — SIGNIFICANT CHANGE UP (ref 11–15.6)
SODIUM SERPL-SCNC: 140 MMOL/L — SIGNIFICANT CHANGE UP (ref 135–145)
SPECIMEN SOURCE: SIGNIFICANT CHANGE UP
URATE SERPL-MCNC: 6 MG/DL — HIGH (ref 2.4–5.7)
WBC # BLD: 6.7 K/UL — SIGNIFICANT CHANGE UP (ref 4.8–10.8)
WBC # FLD AUTO: 6.7 K/UL — SIGNIFICANT CHANGE UP (ref 4.8–10.8)

## 2018-06-06 PROCEDURE — 99232 SBSQ HOSP IP/OBS MODERATE 35: CPT | Mod: GC

## 2018-06-06 RX ADMIN — Medication 650 MILLIGRAM(S): at 00:30

## 2018-06-06 RX ADMIN — Medication 1 SPRAY(S): at 17:46

## 2018-06-06 RX ADMIN — Medication 50 MILLIGRAM(S): at 13:31

## 2018-06-06 RX ADMIN — Medication 650 MILLIGRAM(S): at 01:00

## 2018-06-06 RX ADMIN — PYRAZINAMIDE 1500 MILLIGRAM(S): 500 TABLET ORAL at 13:31

## 2018-06-06 RX ADMIN — ETHAMBUTOL HYDROCHLORIDE 1200 MILLIGRAM(S): 400 TABLET, FILM COATED ORAL at 15:03

## 2018-06-06 RX ADMIN — Medication 1 SPRAY(S): at 06:44

## 2018-06-06 RX ADMIN — Medication 300 MILLIGRAM(S): at 13:30

## 2018-06-06 RX ADMIN — LORATADINE 10 MILLIGRAM(S): 10 TABLET ORAL at 13:31

## 2018-06-06 RX ADMIN — PANTOPRAZOLE SODIUM 40 MILLIGRAM(S): 20 TABLET, DELAYED RELEASE ORAL at 06:44

## 2018-06-06 NOTE — PROGRESS NOTE ADULT - ASSESSMENT
57 y.o. F with hx of HLD and previous alcohol abuse for about 30 yrs ( quit 1 yr ago ), sent from [facility] clinic due to chronic cough associated with sputum production, positive Quantiferon test and hx of TB exposure in the past and prior CXR with calcifications/lesions over right upper lobe.  Pt was admitted due to high risk for active TB. S/p AFB sputum negative x 3. S/p bronchoscopy for BAL - AFB smear/PCR completed 6/5/18 and awaiting final results. Until then patient started on active TB treatment.      1- QuantiFeron positive with RUL lesions/ calcifications and hx TB exposure and concern for active TB infections vs latent TB, high risk for TB   - pt with complaints of chronic cough no other complaints at this time   - c/w airborne precautions   - CT chest with multi focal scarring and multiple small calcifications in the right lung signify prior granulomatous disease. No cavitation or consolidation. Tree in bud opacities in the periphery of the right lower lobe showing changes suggestive of previous granulomatous disease, no clear indication of active TB, may represent bronchiolitis  - sputum culture x 3 collected, no AFB stains noted  - s/p bronchoscopy for BAL awaiting results of AFB smear, culture and PCR  -4 drug TB regimen until we have the PCR and culture result back, if all work up neg. Can convert treatment to Latent TB treatment   -c/w INH 300mg daily, Rifampin 600mg daily, Pyrazinamide 1500mg daily, Ethambutol 1200mg daily, Vit B6 50mg daily   -Medication s/e include hepatitis and neuropathy    -Will follow uric acid currently 6  and hepatic function panel   - ophthalmology exam for baseline opthalmology consulted   -ID f/u noted and appreciated     2-HLD  -pt is not taking any medications at this time  -Lipid panel done last week wnl  -c/w lifestyle modifications diet and exercise   -DASH diet     3- Preventive measure   -DVT proph:   -d/c lovenox for bronch today, but pt is ambulating   -no need for chemical AC     Dispo: Patient to remain inpatient until further results return.

## 2018-06-06 NOTE — PROGRESS NOTE ADULT - SUBJECTIVE AND OBJECTIVE BOX
Patient is a 57y old  Female who presents with a chief complaint of cough, positive QuantiFeron test and abnormal cxr (02 Jun 2018 16:32)      HEALTH ISSUES - PROBLEM Dx:  Preventive measure: Preventive measure  Hyperlipidemia: Hyperlipidemia  Tuberculosis: Tuberculosis      INTERVAL HPI/OVERNIGHT EVENTS:  Patient seen and examined at bedside. No acute events overnight. Patient states she is feeling good, dry cough persists. No other complaints. Pt is eating well, had a bm yest with no other complaints. Patient denies chest pain, SOB, abd pain, N/V, fever, chills, dysuria or any other complaints. All remainder ROS negative.       Vital Signs Last 24 Hrs  T(C): 36.7 (06 Jun 2018 16:25), Max: 37.2 (05 Jun 2018 21:10)  T(F): 98.1 (06 Jun 2018 16:25), Max: 98.9 (05 Jun 2018 21:10)  HR: 69 (06 Jun 2018 16:25) (66 - 69)  BP: 118/85 (06 Jun 2018 16:25) (106/69 - 120/72)  BP(mean): --  RR: 20 (06 Jun 2018 16:25) (20 - 20)  SpO2: 95% (05 Jun 2018 21:10) (95% - 95%)    I&O's Summary    05 Jun 2018 07:01  -  06 Jun 2018 07:00  --------------------------------------------------------  IN: 720 mL / OUT: 0 mL / NET: 720 mL    06 Jun 2018 07:01  -  06 Jun 2018 16:48  --------------------------------------------------------  IN: 200 mL / OUT: 0 mL / NET: 200 mL    Physical Exam:   Gen: No acute distress.  HEENT: Normocephalic, atraumatic, EOMI, PERRL.  CVS: Regular rate and rhythm, +S1/S2, no murmurs, rubs or gallops appreciated  Lungs: Normal respiratory rate and effort, mild crackles noted on upper lobes bilaterally, no wheeze, rales, rhonchi  Abdomen: Normal bowel sounds, soft, nontender, nondistended. No palpable flank tenderness or mass, no CVA tenderness  Ext: No cyanosis, edema or calf tenderness  Neuro: AAOx3, no focal deficits.    MEDICATIONS  (STANDING):  ethambutol 1200 milliGRAM(s) Oral daily  fluticasone propionate 50 MICROgram(s)/spray Nasal Spray 1 Spray(s) Both Nostrils two times a day  isoniazid 300 milliGRAM(s) Oral daily  loratadine 10 milliGRAM(s) Oral daily  pantoprazole    Tablet 40 milliGRAM(s) Oral before breakfast  pyrazinamide 1500 milliGRAM(s) Oral daily  pyridoxine 50 milliGRAM(s) Oral daily  rifampin 600 milliGRAM(s) Oral daily    MEDICATIONS  (PRN):  acetaminophen   Tablet. 650 milliGRAM(s) Oral every 6 hours PRN Mild Pain (1 - 3)  ALBUTerol/ipratropium for Nebulization 3 milliLiter(s) Nebulizer every 6 hours PRN Shortness of Breath and/or Wheezing  traMADol 25 milliGRAM(s) Oral every 8 hours PRN Moderate Pain (4 - 6)      LABS:                        12.0   6.7   )-----------( 204      ( 06 Jun 2018 07:09 )             38.5     06-06    140  |  102  |  24.0<H>  ----------------------------<  90  4.0   |  26.0  |  0.63    Ca    9.1      06 Jun 2018 07:09      RADIOLOGY & ADDITIONAL TESTS:  No new imaging studies

## 2018-06-06 NOTE — CHART NOTE - NSCHARTNOTEFT_GEN_A_CORE
Called by RN notifying that patient agreed to be discharged home. Patient confirmed that she will go home today.    Navya MARTÍNEZ

## 2018-06-07 DIAGNOSIS — Z51.81 ENCOUNTER FOR THERAPEUTIC DRUG LEVEL MONITORING: ICD-10-CM

## 2018-06-07 LAB
CULTURE RESULTS: SIGNIFICANT CHANGE UP
METHOD TYPE: SIGNIFICANT CHANGE UP
ORGANISM # SPEC MICROSCOPIC CNT: SIGNIFICANT CHANGE UP
ORGANISM # SPEC MICROSCOPIC CNT: SIGNIFICANT CHANGE UP
SPECIMEN SOURCE: SIGNIFICANT CHANGE UP

## 2018-06-07 PROCEDURE — 99232 SBSQ HOSP IP/OBS MODERATE 35: CPT

## 2018-06-07 PROCEDURE — 99232 SBSQ HOSP IP/OBS MODERATE 35: CPT | Mod: GC

## 2018-06-07 RX ADMIN — TRAMADOL HYDROCHLORIDE 25 MILLIGRAM(S): 50 TABLET ORAL at 12:37

## 2018-06-07 RX ADMIN — Medication 1 SPRAY(S): at 18:11

## 2018-06-07 RX ADMIN — LORATADINE 10 MILLIGRAM(S): 10 TABLET ORAL at 11:10

## 2018-06-07 RX ADMIN — Medication 300 MILLIGRAM(S): at 11:10

## 2018-06-07 RX ADMIN — PANTOPRAZOLE SODIUM 40 MILLIGRAM(S): 20 TABLET, DELAYED RELEASE ORAL at 06:16

## 2018-06-07 RX ADMIN — ETHAMBUTOL HYDROCHLORIDE 1200 MILLIGRAM(S): 400 TABLET, FILM COATED ORAL at 11:10

## 2018-06-07 RX ADMIN — TRAMADOL HYDROCHLORIDE 25 MILLIGRAM(S): 50 TABLET ORAL at 09:30

## 2018-06-07 RX ADMIN — Medication 1 SPRAY(S): at 06:16

## 2018-06-07 RX ADMIN — PYRAZINAMIDE 1500 MILLIGRAM(S): 500 TABLET ORAL at 12:42

## 2018-06-07 RX ADMIN — Medication 50 MILLIGRAM(S): at 11:10

## 2018-06-07 NOTE — PROGRESS NOTE ADULT - SUBJECTIVE AND OBJECTIVE BOX
CC: Patient is a 57y old  Female who presents with a chief complaint of cough, positive QuantiFeron test and abnormal cxr, admitted to rule out active tuberculosis (02 Jun 2018 16:32)    Overnight/ AM events:  Patient seen and examined at bedside, No acute overnight events.  Patient this AM denies any complains at the moment, amenable to treatment. Explained the reasoning behind the blood work to monitor her liver function and the baseline ophthalmology exam to monitor for adverse effects from medical therapy.    ROS: Denies fever, chest pain, SOB, abdominal pain, diarrhea, constipation, isolated calf pain.    Vital Signs Last 24 Hrs  T(C): 36.7 (07 Jun 2018 04:55), Max: 36.7 (06 Jun 2018 16:25)  T(F): 98.1 (07 Jun 2018 04:55), Max: 98.1 (06 Jun 2018 16:25)  HR: 68 (07 Jun 2018 04:55) (68 - 69)  BP: 139/78 (07 Jun 2018 04:55) (118/85 - 139/78)  BP(mean): --  RR: 20 (07 Jun 2018 04:55) (18 - 20)  SpO2: 100% (07 Jun 2018 04:55) (100% - 100%)    Physical Exam:   Gen: No acute distress.  HEENT: Normocephalic, atraumatic, EOMI, PERRL.  CVS: Regular rate and rhythm, +S1/S2, no murmurs, rubs or gallops appreciated  Lungs: Normal respiratory rate and effort, mild crackles noted on upper lobes bilaterally, no wheeze, rales, rhonchi  Abdomen: Normal bowel sounds, soft, nontender, nondistended. No palpable flank tenderness or mass, no CVA tenderness  Ext: No cyanosis, edema or calf tenderness  Neuro: AAOx3, no focal deficits.    Culture - Acid Fast - Bronchial w/Smear (06.06.18 @ 01:44)    Specimen Source: .Broncial    Acid Fast Bacilli Smear:   No acid fast bacilli seen by fluorochrome stain    MEDICATIONS  (STANDING):  ethambutol 1200 milliGRAM(s) Oral daily  fluticasone propionate 50 MICROgram(s)/spray Nasal Spray 1 Spray(s) Both Nostrils two times a day  isoniazid 300 milliGRAM(s) Oral daily  loratadine 10 milliGRAM(s) Oral daily  pantoprazole    Tablet 40 milliGRAM(s) Oral before breakfast  pyrazinamide 1500 milliGRAM(s) Oral daily  pyridoxine 50 milliGRAM(s) Oral daily  rifampin 600 milliGRAM(s) Oral daily    MEDICATIONS  (PRN):  acetaminophen   Tablet. 650 milliGRAM(s) Oral every 6 hours PRN Mild Pain (1 - 3)  ALBUTerol/ipratropium for Nebulization 3 milliLiter(s) Nebulizer every 6 hours PRN Shortness of Breath and/or Wheezing  traMADol 25 milliGRAM(s) Oral every 8 hours PRN Moderate Pain (4 - 6) CC: Patient is a 57y old  Female who presents with a chief complaint of cough, positive QuantiFeron test and abnormal cxr, admitted to rule out active tuberculosis (02 Jun 2018 16:32)    Overnight/ AM events:  Patient seen and examined at bedside, No acute overnight events.  Patient this AM denies any complains at the moment, amenable to treatment. Explained the reasoning behind the blood work to monitor her liver function and the baseline ophthalmology exam to monitor for adverse effects from medical therapy. Eating well, ambulating and had a bm with no other complications.     ROS: Denies fever, chest pain, SOB, abdominal pain, diarrhea, constipation, isolated calf pain.    Vital Signs Last 24 Hrs  T(C): 36.7 (07 Jun 2018 04:55), Max: 36.7 (06 Jun 2018 16:25)  T(F): 98.1 (07 Jun 2018 04:55), Max: 98.1 (06 Jun 2018 16:25)  HR: 68 (07 Jun 2018 04:55) (68 - 69)  BP: 139/78 (07 Jun 2018 04:55) (118/85 - 139/78)  BP(mean): --  RR: 20 (07 Jun 2018 04:55) (18 - 20)  SpO2: 100% (07 Jun 2018 04:55) (100% - 100%)    Physical Exam:   Gen: No acute distress.  HEENT: Normocephalic, atraumatic, EOMI, PERRL.  CVS: Regular rate and rhythm, +S1/S2, no murmurs, rubs or gallops appreciated  Lungs: Normal respiratory rate and effort, mild crackles noted on upper lobes bilaterally, no wheeze, rales, rhonchi  Abdomen: Normal bowel sounds, soft, nontender, nondistended. No palpable flank tenderness or mass, no CVA tenderness  Ext: No cyanosis, edema or calf tenderness  Neuro: AAOx3, no focal deficits.    Culture - Acid Fast - Bronchial w/Smear (06.06.18 @ 01:44)    Specimen Source: .Broncial    Acid Fast Bacilli Smear:   No acid fast bacilli seen by fluorochrome stain    MEDICATIONS  (STANDING):  ethambutol 1200 milliGRAM(s) Oral daily  fluticasone propionate 50 MICROgram(s)/spray Nasal Spray 1 Spray(s) Both Nostrils two times a day  isoniazid 300 milliGRAM(s) Oral daily  loratadine 10 milliGRAM(s) Oral daily  pantoprazole    Tablet 40 milliGRAM(s) Oral before breakfast  pyrazinamide 1500 milliGRAM(s) Oral daily  pyridoxine 50 milliGRAM(s) Oral daily  rifampin 600 milliGRAM(s) Oral daily    MEDICATIONS  (PRN):  acetaminophen   Tablet. 650 milliGRAM(s) Oral every 6 hours PRN Mild Pain (1 - 3)  ALBUTerol/ipratropium for Nebulization 3 milliLiter(s) Nebulizer every 6 hours PRN Shortness of Breath and/or Wheezing  traMADol 25 milliGRAM(s) Oral every 8 hours PRN Moderate Pain (4 - 6)

## 2018-06-07 NOTE — PROGRESS NOTE ADULT - SUBJECTIVE AND OBJECTIVE BOX
56y/o Female s/p flex bronchoscopy, lavage. doing well, VSS, Pain controlled.   No anesthetic complications noted at this time.

## 2018-06-07 NOTE — PROGRESS NOTE ADULT - ASSESSMENT
57 y.o. F with hx of HLD and previous alcohol abuse for about 30 yrs ( quit 1 yr ago ), sent from [facility] clinic due to chronic cough associated with sputum production, positive Quantiferon test and hx of TB exposure in the past and prior CXR with calcifications/lesions over right upper lobe.  Pt was admitted due to high risk for active TB. S/p Sputum AFB negative x 3. ID continues to follow the patient and CT sx consulted for bronchoscopy for BAL - AFB smear, smear resulted negative. Currently undergoing active Tb treatment pending results of bronchoscopy culture and PCR.     1- QuantiFeron positive with RUL lesions/ calcifications and hx TB exposure and concern for active TB infections vs latent TB, high risk for TB   - pt with complaints of chronic cough no other complaints at this time   - c/w airborne precautions   - CT chest with multi focal scarring and multiple small calcifications in the right lung signify prior granulomatous disease. No cavitation or consolidation. Tree in bud opacities in the periphery of the right lower lobe showing changes suggestive of previous granulomatous disease, no clear indication of active TB, may represent bronchiolitis  - sputum culture x 3 collected, no AFB stains noted  - s/p bronchoscopy for BAL performed on 06/05/2018 awaiting results of culture and PCR. AFB smear negative.  - ID f/u noted and appreciated and d/w GUNJAN pt to remain on isolation and start TB medications x 14 days prior to discharge   - Pulm consult noted and appreciated    - Ct surgery f/u noted and appreciated      2-HLD  -pt is not taking any medications at this time  -Lipid panel done last week wnl  -c/w lifestyle modifications diet and exercise   -DASH diet     3- Preventive measure   -DVT proph:   -d/c lovenox for bronch today, but pt is ambulating   -no need for chemical AC     Dispo: Patient to remain inpatient for 14 days on TB tx, thereafter can be discharged to home. 57 y.o. F with hx of HLD and previous alcohol abuse for about 30 yrs ( quit 1 yr ago ), sent from [facility] clinic due to chronic cough associated with sputum production, positive Quantiferon test and hx of TB exposure in the past and prior CXR with calcifications/lesions over right upper lobe.  Pt was admitted due to high risk for active TB. S/p Sputum AFB negative x 3. ID continues to follow the patient and CT sx consulted for bronchoscopy for BAL - AFB smear, smear resulted negative. Currently undergoing active Tb treatment pending results of bronchoscopy culture.   Which will likely take multiple weeks to result. Patient will actively be treated for 2 weeks total inpatient as per GUNJAN.     1- QuantiFeron positive with RUL lesions/ calcifications and hx TB exposure and concern for active TB infections vs latent TB, high risk for TB   - pt with complaints of chronic cough no other complaints at this time   - c/w airborne precautions   - CT chest with multi focal scarring and multiple small calcifications in the right lung signify prior granulomatous disease. No cavitation or consolidation. Tree in bud opacities in the periphery of the right lower lobe showing changes suggestive of previous granulomatous disease, no clear indication of active TB, may represent bronchiolitis  - sputum culture x 3 collected, no AFB noted  - s/p bronchoscopy for BAL performed on 06/05/2018 awaiting results of culture which can take up to 4 weeks to return  -AFB smear negative from bronch  -4 drug TB regimen until we have the culture result back, if all work up neg. Can convert treatment to Latent TB treatment.   -c/w INH 300mg daily, Rifampin 600mg daily, Pyrazinamide 1500mg daily, Ethambutol 1200mg daily, Vit B6 50mg daily D#3   -Medication s/e include hepatitis and neuropathy    -Will follow uric acid  and hepatic function panel intermittently   - ophthalmology exam for baseline opthalmology consulted   - ID f/u noted and appreciated and d/w GUNJAN pt to remain on isolation and complete 14 days of tx inpatient prior to being released for disposition     2-HLD  -pt is not taking any medications at this time  -Lipid panel done last week wnl  -c/w lifestyle modifications diet and exercise   -DASH diet     3- Preventive measure   -DVT proph: pt is ambulating   -no need for chemical AC     Dispo: Patient to remain inpatient for 14 days on active TB tx, thereafter can be discharged to home to f/u as an outpt.

## 2018-06-07 NOTE — CONSULT NOTE ADULT - SUBJECTIVE AND OBJECTIVE BOX
58 yo F admitted with + Quantiferon + hx TB exposure in the past, with night sweats and productive sputum. Patient seen by ID and started on 4-drug TB regimen, including ethambutol. I was called for eye evaluation for start of recent ethambutol. At time of exam pt denies eye pain, change in vision, photophobia, flashes or floaters. Patient denies hx of eye surgery nor recent eye infections.     VA (SC-N) (patient does not have reading glasses) - 20/100 OU  P: Round Reactive no RAPD  Tp: 15mmHg OU  EOM: full OU  Color plates 6/6 OU    Bedside exam:  Lids/lashes: unremarkable OU  Conjunctiva/sclera: white and quiet OU  Cornea: clear OU  Anterior chamber: formed OU  Lens: +NS Cataract OU    Vitreous: appears clear OU  Optic nerve: sharp, pink and flat OU  Macula: hazy view, no occult heme OU    A/P; 58 yo F admitted with suspected TB - on 4 drug tx pending PCR results, including ethambutol.  Given color vision (100% on testing) and optic nerve appearance, there is not likely any optic neuropathy present.   Decreased visual acuity is likely secondary to cataract and need for reading glasses; however my exam is limited at bedside  No acute ophthalmic intervention is required at this time  Thank you for the consult and please reconsult PRN.

## 2018-06-07 NOTE — PROGRESS NOTE ADULT - ASSESSMENT
58 y/o woman with PMH of HLD and alcohol abuse in the past was admitted with positive QuantiFeron test, cough and sputum for about 2 years with some weight loss and night sweats.   She had contact with possible TB cases about 15-20 years ago.   TB is a possibility but sometimes old infection causing granulomas/scars as well.   HIV neg. CT suggestive of scars of old infection.  Even though AFBx3 sputua negative but she is very high risk for TB.   Had bronch 6/5 and BAL specimen sent for culture and AFB and PCR so far neg.     1-R/O TB:  -Sputum AFB smear x 3 neg, follow up the cultures.   -started on 4 drug TB regimen on 6/5 until we have the PCR and culture result back, if all work up neg. can convert treatment to LTBI treatment   -Started on INH 300mg daily, Rifampin 600mg daily, Pyrazinamide 1500mg daily, Ethambutol 1200mg daily, Vit B6 50mg daily.   -Follow up clinically for hepatitis and repeat LFTs as needed.    -Will do LFT in about 1 week  -Will do AFB smear due to [facility] recommendation after bronch, only once.

## 2018-06-07 NOTE — CONSULT NOTE ADULT - PROBLEM SELECTOR RECOMMENDATION 9
+ QuantiFeron , +calcification lesion RUL, negative acid fast   ID following   cont current management   Will discuss case with Dr Emery
Given color vision (100% on testing) and optic nerve appearance, there is not likely any optic neuropathy present.   Decreased visual acuity is likely secondary to cataract and need for reading glasses; however my exam is limited at bedside  No acute ophthalmic intervention is required at this time.  Care per primary team and ID

## 2018-06-07 NOTE — CONSULT NOTE ADULT - ASSESSMENT
A/P; 58 yo F admitted with suspected TB - on 4 drug tx pending PCR results, including ethambutol.  Given color vision (100% on testing) and optic nerve appearance, there is not likely any optic neuropathy present.   Decreased visual acuity is likely secondary to cataract and need for reading glasses; however my exam is limited at bedside  No acute ophthalmic intervention is required at this time  Thank you for the consult and please reconsult PRN.

## 2018-06-08 LAB
HAV IGM SER-ACNC: SIGNIFICANT CHANGE UP
HBV CORE IGM SER-ACNC: SIGNIFICANT CHANGE UP
HBV SURFACE AG SER-ACNC: SIGNIFICANT CHANGE UP
HCV AB S/CO SERPL IA: 0.16 S/CO — SIGNIFICANT CHANGE UP
HCV AB SERPL-IMP: SIGNIFICANT CHANGE UP

## 2018-06-08 PROCEDURE — 99232 SBSQ HOSP IP/OBS MODERATE 35: CPT | Mod: GC

## 2018-06-08 RX ADMIN — TRAMADOL HYDROCHLORIDE 25 MILLIGRAM(S): 50 TABLET ORAL at 05:35

## 2018-06-08 RX ADMIN — PANTOPRAZOLE SODIUM 40 MILLIGRAM(S): 20 TABLET, DELAYED RELEASE ORAL at 05:28

## 2018-06-08 RX ADMIN — Medication 1 SPRAY(S): at 05:28

## 2018-06-08 RX ADMIN — ETHAMBUTOL HYDROCHLORIDE 1200 MILLIGRAM(S): 400 TABLET, FILM COATED ORAL at 14:22

## 2018-06-08 RX ADMIN — Medication 50 MILLIGRAM(S): at 14:23

## 2018-06-08 RX ADMIN — LORATADINE 10 MILLIGRAM(S): 10 TABLET ORAL at 14:23

## 2018-06-08 RX ADMIN — Medication 300 MILLIGRAM(S): at 14:22

## 2018-06-08 RX ADMIN — Medication 1 SPRAY(S): at 17:15

## 2018-06-08 RX ADMIN — PYRAZINAMIDE 1500 MILLIGRAM(S): 500 TABLET ORAL at 14:23

## 2018-06-08 NOTE — PROGRESS NOTE ADULT - ASSESSMENT
57 y.o. F with hx of HLD and previous alcohol abuse for about 30 yrs ( quit 1 yr ago ), sent from [facility] clinic due to chronic cough associated with sputum production, positive Quantiferon test and hx of TB exposure in the past and prior CXR with calcifications/lesions over right upper lobe.  Pt was admitted due to high risk for active TB. S/p Sputum AFB negative x 3. ID continues to follow the patient and CT sx consulted for bronchoscopy for BAL - AFB smear, smear resulted negative. Currently undergoing active Tb treatment pending results of bronchoscopy culture.   Which will likely take multiple weeks to result. Patient will actively be treated for 2 weeks total inpatient as per GUNJAN.     1- QuantiFeron positive with RUL lesions/ calcifications and hx TB exposure and concern for active TB infections vs latent TB, high risk for TB   - pt with complaints of chronic cough no other complaints at this time   - c/w airborne precautions   - CT chest with multi focal scarring and multiple small calcifications in the right lung signify prior granulomatous disease. No cavitation or consolidation. Tree in bud opacities in the periphery of the right lower lobe showing changes suggestive of previous granulomatous disease, no clear indication of active TB, may represent bronchiolitis  - sputum culture x 3 collected, no AFB noted  - s/p bronchoscopy for BAL performed on 06/05/2018 awaiting results of culture which can take up to 4 weeks to return  -AFB smear negative from bronch  -4 drug TB regimen until we have the culture result back, if all work up neg. Can convert treatment to Latent TB treatment.   -c/w INH 300mg daily, Rifampin 600mg daily, Pyrazinamide 1500mg daily, Ethambutol 1200mg daily, Vit B6 50mg daily D#3   -Medication s/e include hepatitis and neuropathy    -Will follow uric acid  and hepatic function panel intermittently   - ophthalmology exam for baseline opthalmology consulted   - ID f/u noted and appreciated and d/w GUNJAN pt to remain on isolation and complete 14 days of tx inpatient prior to being released for disposition     2-HLD  -pt is not taking any medications at this time  -Lipid panel done last week wnl  -c/w lifestyle modifications diet and exercise   -DASH diet     3- Preventive measure   -DVT proph: pt is ambulating   -no need for chemical AC     Dispo: Patient to remain inpatient for 14 days on active TB tx, thereafter can be discharged to home to f/u as an outpt. 57 y.o. F with hx of HLD and previous alcohol abuse for about 30 yrs ( quit 1 yr ago ), sent from [facility] clinic due to chronic cough associated with sputum production, positive Quantiferon test and hx of TB exposure in the past and prior CXR with calcifications/lesions over right upper lobe.  Pt was admitted due to high risk for active TB. S/p Sputum AFB negative x 3. ID continues to follow the patient and CT sx consulted for bronchoscopy for BAL - AFB smear, smear resulted negative. Currently undergoing active Tb treatment pending results of bronchoscopy culture.   Which will likely take multiple weeks to result. Patient will actively be treated for 2 weeks total inpatient as per GUNJAN.     1- QuantiFeron positive with RUL lesions/ calcifications and hx TB exposure and concern for active TB infections vs latent TB, high risk for TB   - pt with complaints of chronic cough no other complaints at this time   - c/w airborne precautions   - CT chest with multi focal scarring and multiple small calcifications in the right lung signify prior granulomatous disease. No cavitation or consolidation. Tree in bud opacities in the periphery of the right lower lobe showing changes suggestive of previous granulomatous disease, no clear indication of active TB, may represent bronchiolitis  - sputum culture x 3 collected, no AFB noted  - s/p bronchoscopy for BAL performed on 06/05/2018 awaiting results of culture which can take up to 4 weeks to return  -AFB smear negative from bronch  -4 drug TB regimen until we have the culture result back, if all work up neg. Can convert treatment to Latent TB treatment.   -c/w INH 300mg daily, Rifampin 600mg daily, Pyrazinamide 1500mg daily, Ethambutol 1200mg daily, Vit B6 50mg daily D#4   -Medication s/e include hepatitis and neuropathy    -Will follow uric acid  and hepatic function panel intermittently   - ophthalmology consult appreciated.  - ID f/u noted and appreciated and d/w GUNJAN pt to remain on isolation and complete 14 days of tx inpatient prior to being released for disposition     2-HLD  -pt is not taking any medications at this time  -Lipid panel done last week wnl  -c/w lifestyle modifications diet and exercise   -DASH diet     3- Preventive measure   -DVT proph: pt is ambulating   -no need for chemical AC     Dispo: Patient to remain inpatient for 14 days on active TB tx, thereafter can be discharged to home to f/u as an outpt. 57 y.o. F with hx of HLD and previous alcohol abuse for about 30 yrs (quit 1 yr ago), sent from [facility] clinic due to chronic cough associated with sputum production, positive Quantiferon test and hx of TB exposure in the past and prior CXR with calcifications/lesions over right upper lobe.  Pt was admitted due to high risk for active TB. S/p Sputum AFB negative x 3. ID consulted and recommended CT sx consultation for bronchoscopy for BAL. S/p procedure on 6/5/18 with negative AFB smear, pending official culture results in the interim on active Tb treatment.  Patient will actively be treated for 2 weeks total inpatient as per GUNJAN. Thereafter culture results will be followed and if negative will be transitioned to latent TB tx, is positive will be maintained on active TB tx regimen.     1- QuantiFeron positive with RUL lesions/ calcifications and hx TB exposure and concern for active TB infection vs latent TB, high risk for TB   - pt with complaints of chronic cough no other complaints at this time   - c/w airborne precautions   - sputum culture x 3 collected, no AFB noted  -AFB smear negative from bronch  - awaiting results of BAL culture which can take up to 4 weeks to return   -4 drug TB regimen until we have the culture results back, if all work up is negative then at that point can convert treatment to Latent TB treatment.   -c/w INH 300mg daily, Rifampin 600mg daily, Pyrazinamide 1500mg daily, Ethambutol 1200mg daily, Vit B6 50mg daily D#4   -Medication s/e include hepatitis and neuropathy    -Will follow uric acid  and hepatic function panel intermittently   - ophthalmology consult appreciated  - ID f/u noted and appreciated and d/w GUNJAN for pt to remain on isolation and complete 14 days of tx inpatient prior to being released for disposition to c/w treatment as an outpt      2-HLD  -pt is not taking any medications at this time  -Lipid panel done last week wnl  -c/w lifestyle modifications diet and exercise   -DASH diet     3- Preventive measure   -DVT proph: pt is ambulating   -no need for chemical AC     Dispo: Patient to remain inpatient for 14 days on active TB tx, thereafter can be discharged to home to f/u as an outpt.

## 2018-06-08 NOTE — PROGRESS NOTE ADULT - SUBJECTIVE AND OBJECTIVE BOX
CC: Patient is a 57y old  Female who presents with a chief complaint of cough, positive QuantiFeron test and abnormal cxr, admitted to rule out active tuberculosis (02 Jun 2018 16:32)    Overnight/ AM events:  Patient seen and examined at bedside, No acute overnight events.  Patient this AM denies any complains at the moment, amenable to treatment. Explained the reasoning behind the blood work to monitor her liver function and the baseline ophthalmology exam to monitor for adverse effects from medical therapy. Eating well, ambulating and had a bm with no other complications.     ROS: Denies fever, chest pain, SOB, abdominal pain, diarrhea, constipation, isolated calf pain.    Vital Signs Last 24 Hrs  T(C): 36.7 (08 Jun 2018 05:07), Max: 36.9 (07 Jun 2018 11:49)  T(F): 98 (08 Jun 2018 05:07), Max: 98.5 (07 Jun 2018 11:49)  HR: 77 (08 Jun 2018 05:07) (71 - 77)  BP: 98/73 (08 Jun 2018 05:07) (98/73 - 123/79)  BP(mean): --  RR: 18 (08 Jun 2018 05:07) (18 - 20)  SpO2: 99% (08 Jun 2018 05:07) (99% - 99%)    Physical Exam:   Gen: No acute distress.  HEENT: Normocephalic, atraumatic, EOMI, PERRL.  CVS: Regular rate and rhythm, +S1/S2, no murmurs, rubs or gallops appreciated  Lungs: Normal respiratory rate and effort, mild crackles noted on upper lobes bilaterally, no wheeze, rales, rhonchi  Abdomen: Normal bowel sounds, soft, nontender, nondistended. No palpable flank tenderness or mass, no CVA tenderness  Ext: No cyanosis, edema or calf tenderness  Neuro: AAOx3, no focal deficits.    Culture - Acid Fast - Bronchial w/Smear (06.06.18 @ 01:44)    Specimen Source: .Broncial    Acid Fast Bacilli Smear:   No acid fast bacilli seen by fluorochrome stain    MEDICATIONS  (STANDING):  ethambutol 1200 milliGRAM(s) Oral daily  fluticasone propionate 50 MICROgram(s)/spray Nasal Spray 1 Spray(s) Both Nostrils two times a day  isoniazid 300 milliGRAM(s) Oral daily  loratadine 10 milliGRAM(s) Oral daily  pantoprazole    Tablet 40 milliGRAM(s) Oral before breakfast  pyrazinamide 1500 milliGRAM(s) Oral daily  pyridoxine 50 milliGRAM(s) Oral daily  rifampin 600 milliGRAM(s) Oral daily    MEDICATIONS  (PRN):  acetaminophen   Tablet. 650 milliGRAM(s) Oral every 6 hours PRN Mild Pain (1 - 3)  ALBUTerol/ipratropium for Nebulization 3 milliLiter(s) Nebulizer every 6 hours PRN Shortness of Breath and/or Wheezing  traMADol 25 milliGRAM(s) Oral every 8 hours PRN Moderate Pain (4 - 6) CC: Patient is a 57y old  Female who presents with a chief complaint of cough, positive QuantiFeron test and abnormal cxr, admitted to rule out active tuberculosis (02 Jun 2018 16:32)    Overnight/ AM events:  Patient seen and examined at bedside. Today patient states that she has some nausea, with dry heaves and headache. She was instructed to request the nurses for her PRN medication for headache and nausea.    ROS: Denies fever, chest pain, SOB, abdominal pain, diarrhea, constipation, isolated calf pain.  Acute Hepatitis Panel (06.07.18 @ 17:43)    Hepatitis C Virus Interpretation: Nonreact: Hepatitis C AB  S/CO Ratio                        Interpretation  < 1.0                                     Non-Reactive  1.0 - 4.9                           Weakly-Reactive  > 5.0                                 Reactive  Non-Reactive: A person witha non-reactive HCV antibody result is  considered uninfected.  No further action is needed unless recent  infection is suspected.  In these cases, consider repeat testing later to  detect seroconversion..  Weakly-Reactive: HCV antibody test is abnormal, HCV RNA Qualitative test  will follow.  Reactive: HCV antibody test is abnormal, HCV RNA Qualitative test will  follow.  Note: HCV antibody testing is performed on the Abbott  system.    Hepatitis C Virus S/CO Ratio: 0.16 S/CO    Hepatitis B Core IgM Antibody: Nonreact    Hepatitis B Surface Antigen: Nonreact    Hepatitis A IgM Antibody: Nonreact      Vital Signs Last 24 Hrs  T(C): 36.7 (08 Jun 2018 05:07), Max: 36.9 (07 Jun 2018 11:49)  T(F): 98 (08 Jun 2018 05:07), Max: 98.5 (07 Jun 2018 11:49)  HR: 77 (08 Jun 2018 05:07) (71 - 77)  BP: 98/73 (08 Jun 2018 05:07) (98/73 - 123/79)  BP(mean): --  RR: 18 (08 Jun 2018 05:07) (18 - 20)  SpO2: 99% (08 Jun 2018 05:07) (99% - 99%)    Physical Exam:   Gen: No acute distress.  HEENT: Normocephalic, atraumatic, EOMI, PERRL.  CVS: Regular rate and rhythm, +S1/S2, no murmurs, rubs or gallops appreciated  Lungs: Normal respiratory rate and effort, mild crackles noted on upper lobes bilaterally, no wheeze, rales, rhonchi  Abdomen: Normal bowel sounds, soft, nontender, nondistended. No palpable flank tenderness or mass, no CVA tenderness  Ext: No cyanosis, edema or calf tenderness  Neuro: AAOx3, no focal deficits.        MEDICATIONS  (STANDING):  ethambutol 1200 milliGRAM(s) Oral daily  fluticasone propionate 50 MICROgram(s)/spray Nasal Spray 1 Spray(s) Both Nostrils two times a day  isoniazid 300 milliGRAM(s) Oral daily  loratadine 10 milliGRAM(s) Oral daily  pantoprazole    Tablet 40 milliGRAM(s) Oral before breakfast  pyrazinamide 1500 milliGRAM(s) Oral daily  pyridoxine 50 milliGRAM(s) Oral daily  rifampin 600 milliGRAM(s) Oral daily    MEDICATIONS  (PRN):  acetaminophen   Tablet. 650 milliGRAM(s) Oral every 6 hours PRN Mild Pain (1 - 3)  ALBUTerol/ipratropium for Nebulization 3 milliLiter(s) Nebulizer every 6 hours PRN Shortness of Breath and/or Wheezing  traMADol 25 milliGRAM(s) Oral every 8 hours PRN Moderate Pain (4 - 6) CC: Patient is a 57y old  Female who presents with a chief complaint of cough, positive QuantiFeron test and abnormal cxr, admitted to rule out active tuberculosis (02 Jun 2018 16:32)    Overnight/ AM events:  Patient seen and examined at bedside. Today patient states that she has some nausea, with dry heaves and headache. She was instructed to request the nurses for her PRN medication for headache and nausea.    ROS: Denies fever, chest pain, SOB, abdominal pain, diarrhea, constipation, isolated calf pain.    Vital Signs Last 24 Hrs  T(C): 36.7 (08 Jun 2018 05:07), Max: 36.9 (07 Jun 2018 11:49)  T(F): 98 (08 Jun 2018 05:07), Max: 98.5 (07 Jun 2018 11:49)  HR: 77 (08 Jun 2018 05:07) (71 - 77)  BP: 98/73 (08 Jun 2018 05:07) (98/73 - 123/79)  BP(mean): --  RR: 18 (08 Jun 2018 05:07) (18 - 20)  SpO2: 99% (08 Jun 2018 05:07) (99% - 99%)    Physical Exam:   Gen: No acute distress.  HEENT: Normocephalic, atraumatic, EOMI, PERRL.  CVS: Regular rate and rhythm, +S1/S2, no murmurs, rubs or gallops appreciated  Lungs: Normal respiratory rate and effort, mild crackles noted on upper lobes bilaterally, no wheeze, rales, rhonchi  Abdomen: Normal bowel sounds, soft, nontender, nondistended. No palpable flank tenderness or mass, no CVA tenderness  Ext: No cyanosis, edema or calf tenderness  Neuro: AAOx3, no focal deficits.    MEDICATIONS  (STANDING):  ethambutol 1200 milliGRAM(s) Oral daily  fluticasone propionate 50 MICROgram(s)/spray Nasal Spray 1 Spray(s) Both Nostrils two times a day  isoniazid 300 milliGRAM(s) Oral daily  loratadine 10 milliGRAM(s) Oral daily  pantoprazole    Tablet 40 milliGRAM(s) Oral before breakfast  pyrazinamide 1500 milliGRAM(s) Oral daily  pyridoxine 50 milliGRAM(s) Oral daily  rifampin 600 milliGRAM(s) Oral daily    MEDICATIONS  (PRN):  acetaminophen   Tablet. 650 milliGRAM(s) Oral every 6 hours PRN Mild Pain (1 - 3)  ALBUTerol/ipratropium for Nebulization 3 milliLiter(s) Nebulizer every 6 hours PRN Shortness of Breath and/or Wheezing  traMADol 25 milliGRAM(s) Oral every 8 hours PRN Moderate Pain (4 - 6)      Imaging: No new imaging studies

## 2018-06-09 LAB
ALBUMIN SERPL ELPH-MCNC: 4.3 G/DL — SIGNIFICANT CHANGE UP (ref 3.3–5.2)
ALP SERPL-CCNC: 157 U/L — HIGH (ref 40–120)
ALT FLD-CCNC: 39 U/L — HIGH
ANION GAP SERPL CALC-SCNC: 13 MMOL/L — SIGNIFICANT CHANGE UP (ref 5–17)
AST SERPL-CCNC: 33 U/L — HIGH
BASOPHILS # BLD AUTO: 0 K/UL — SIGNIFICANT CHANGE UP (ref 0–0.2)
BASOPHILS NFR BLD AUTO: 0.4 % — SIGNIFICANT CHANGE UP (ref 0–2)
BILIRUB SERPL-MCNC: 0.4 MG/DL — SIGNIFICANT CHANGE UP (ref 0.4–2)
BUN SERPL-MCNC: 14 MG/DL — SIGNIFICANT CHANGE UP (ref 8–20)
CALCIUM SERPL-MCNC: 9.7 MG/DL — SIGNIFICANT CHANGE UP (ref 8.6–10.2)
CHLORIDE SERPL-SCNC: 97 MMOL/L — LOW (ref 98–107)
CO2 SERPL-SCNC: 29 MMOL/L — SIGNIFICANT CHANGE UP (ref 22–29)
CREAT SERPL-MCNC: 0.56 MG/DL — SIGNIFICANT CHANGE UP (ref 0.5–1.3)
EOSINOPHIL # BLD AUTO: 0.7 K/UL — HIGH (ref 0–0.5)
EOSINOPHIL NFR BLD AUTO: 12.3 % — HIGH (ref 0–6)
GLUCOSE SERPL-MCNC: 92 MG/DL — SIGNIFICANT CHANGE UP (ref 70–115)
HCT VFR BLD CALC: 39.6 % — SIGNIFICANT CHANGE UP (ref 37–47)
HGB BLD-MCNC: 12.4 G/DL — SIGNIFICANT CHANGE UP (ref 12–16)
LYMPHOCYTES # BLD AUTO: 1.5 K/UL — SIGNIFICANT CHANGE UP (ref 1–4.8)
LYMPHOCYTES # BLD AUTO: 27 % — SIGNIFICANT CHANGE UP (ref 20–55)
MCHC RBC-ENTMCNC: 28.2 PG — SIGNIFICANT CHANGE UP (ref 27–31)
MCHC RBC-ENTMCNC: 31.3 G/DL — LOW (ref 32–36)
MCV RBC AUTO: 90 FL — SIGNIFICANT CHANGE UP (ref 81–99)
MONOCYTES # BLD AUTO: 0.4 K/UL — SIGNIFICANT CHANGE UP (ref 0–0.8)
MONOCYTES NFR BLD AUTO: 7.9 % — SIGNIFICANT CHANGE UP (ref 3–10)
NEUTROPHILS # BLD AUTO: 2.9 K/UL — SIGNIFICANT CHANGE UP (ref 1.8–8)
NEUTROPHILS NFR BLD AUTO: 51.5 % — SIGNIFICANT CHANGE UP (ref 37–73)
NIGHT BLUE STAIN TISS: SIGNIFICANT CHANGE UP
PLATELET # BLD AUTO: 236 K/UL — SIGNIFICANT CHANGE UP (ref 150–400)
POTASSIUM SERPL-MCNC: 4 MMOL/L — SIGNIFICANT CHANGE UP (ref 3.5–5.3)
POTASSIUM SERPL-SCNC: 4 MMOL/L — SIGNIFICANT CHANGE UP (ref 3.5–5.3)
PROT SERPL-MCNC: 8.1 G/DL — SIGNIFICANT CHANGE UP (ref 6.6–8.7)
RBC # BLD: 4.4 M/UL — SIGNIFICANT CHANGE UP (ref 4.4–5.2)
RBC # FLD: 14 % — SIGNIFICANT CHANGE UP (ref 11–15.6)
SODIUM SERPL-SCNC: 139 MMOL/L — SIGNIFICANT CHANGE UP (ref 135–145)
SPECIMEN SOURCE: SIGNIFICANT CHANGE UP
WBC # BLD: 5.6 K/UL — SIGNIFICANT CHANGE UP (ref 4.8–10.8)
WBC # FLD AUTO: 5.6 K/UL — SIGNIFICANT CHANGE UP (ref 4.8–10.8)

## 2018-06-09 PROCEDURE — 99233 SBSQ HOSP IP/OBS HIGH 50: CPT | Mod: GC

## 2018-06-09 RX ADMIN — ETHAMBUTOL HYDROCHLORIDE 1200 MILLIGRAM(S): 400 TABLET, FILM COATED ORAL at 13:24

## 2018-06-09 RX ADMIN — PANTOPRAZOLE SODIUM 40 MILLIGRAM(S): 20 TABLET, DELAYED RELEASE ORAL at 06:35

## 2018-06-09 RX ADMIN — Medication 300 MILLIGRAM(S): at 13:25

## 2018-06-09 RX ADMIN — Medication 50 MILLIGRAM(S): at 13:24

## 2018-06-09 RX ADMIN — LORATADINE 10 MILLIGRAM(S): 10 TABLET ORAL at 13:25

## 2018-06-09 RX ADMIN — PYRAZINAMIDE 1500 MILLIGRAM(S): 500 TABLET ORAL at 13:25

## 2018-06-09 RX ADMIN — Medication 1 SPRAY(S): at 06:35

## 2018-06-09 RX ADMIN — Medication 1 SPRAY(S): at 15:59

## 2018-06-09 NOTE — PROGRESS NOTE ADULT - ATTENDING COMMENTS
Patient seen and examined at the bedside. Agree with the above history, physical, assessment, and plan with the necessary amendments/elaborations below:    stable. no complaints. on tx for TB, must stay inpatient to complete 14d course prior to dc home. will cont to follow ID consult.

## 2018-06-09 NOTE — PROGRESS NOTE ADULT - SUBJECTIVE AND OBJECTIVE BOX
CC: Patient is a 57y old  Female who presents with a chief complaint of cough, positive QuantiFeron test and abnormal cxr, admitted to rule out active tuberculosis (02 Jun 2018 16:32)    Overnight/ AM events:  Patient seen and examined at bedside. Currently with no complaints.    ROS: Denies fever, chest pain, SOB, abdominal pain, diarrhea, constipation, isolated calf pain.    Vital Signs Last 24 Hrs  T(C): 36.9 (09 Jun 2018 05:13), Max: 37.2 (08 Jun 2018 20:30)  T(F): 98.4 (09 Jun 2018 05:13), Max: 98.9 (08 Jun 2018 20:30)  HR: 65 (09 Jun 2018 05:13) (65 - 76)  BP: 130/76 (09 Jun 2018 05:13) (96/60 - 142/75)  BP(mean): --  RR: 20 (09 Jun 2018 05:13) (14 - 20)  SpO2: 97% (08 Jun 2018 20:30) (97% - 98%)    Physical Exam:   Gen: No acute distress.  HEENT: Normocephalic, atraumatic, EOMI, PERRL.  CVS: Regular rate and rhythm, +S1/S2, no murmurs, rubs or gallops appreciated  Lungs: Normal respiratory rate and effort, mild crackles noted on upper lobes bilaterally, no wheeze, rales, rhonchi  Abdomen: Normal bowel sounds, soft, nontender, nondistended. No palpable flank tenderness or mass, no CVA tenderness  Ext: No cyanosis, edema or calf tenderness  Neuro: AAOx3, no focal deficits.    MEDICATIONS  (STANDING):  ethambutol 1200 milliGRAM(s) Oral daily  fluticasone propionate 50 MICROgram(s)/spray Nasal Spray 1 Spray(s) Both Nostrils two times a day  isoniazid 300 milliGRAM(s) Oral daily  loratadine 10 milliGRAM(s) Oral daily  pantoprazole    Tablet 40 milliGRAM(s) Oral before breakfast  pyrazinamide 1500 milliGRAM(s) Oral daily  pyridoxine 50 milliGRAM(s) Oral daily  rifampin 600 milliGRAM(s) Oral daily    MEDICATIONS  (PRN):  acetaminophen   Tablet. 650 milliGRAM(s) Oral every 6 hours PRN Mild Pain (1 - 3)  ALBUTerol/ipratropium for Nebulization 3 milliLiter(s) Nebulizer every 6 hours PRN Shortness of Breath and/or Wheezing  traMADol 25 milliGRAM(s) Oral every 8 hours PRN Moderate Pain (4 - 6)

## 2018-06-09 NOTE — PROGRESS NOTE ADULT - ASSESSMENT
57 y.o. F with hx of HLD and previous alcohol abuse for about 30 yrs (quit 1 yr ago), sent from [facility] clinic due to chronic cough associated with sputum production, positive Quantiferon test and hx of TB exposure in the past and prior CXR with calcifications/lesions over right upper lobe.  Pt was admitted due to high risk for active TB. S/p Sputum AFB negative x 3. ID consulted and recommended CT sx consultation for bronchoscopy for BAL. S/p procedure on 6/5/18 with negative AFB smear, pending official culture results in the interim on active Tb treatment.  Patient will actively be treated for 2 weeks total inpatient as per GUNJAN. Thereafter culture results will be followed and if negative will be transitioned to latent TB tx, is positive will be maintained on active TB tx regimen.     1- QuantiFeron positive with RUL lesions/ calcifications and hx TB exposure and concern for active TB infection vs latent TB, high risk for TB   - pt with complaints of chronic cough no other complaints at this time   - c/w airborne precautions   - sputum culture x 3 collected, no AFB noted  -AFB smear negative from bronch  - awaiting results of BAL culture which can take up to 4 weeks to return   -4 drug TB regimen until we have the culture results back, if all work up is negative then at that point can convert treatment to Latent TB treatment.   -c/w INH 300mg daily, Rifampin 600mg daily, Pyrazinamide 1500mg daily, Ethambutol 1200mg daily, Vit B6 50mg daily D#5   -Medication s/e include hepatitis and neuropathy    -Will follow uric acid  and hepatic function panel intermittently   - ophthalmology consult appreciated  - ID f/u noted and appreciated and d/w GUNJAN for pt to remain on isolation and complete 14 days of tx inpatient prior to being released for disposition to c/w treatment as an outpt      2-HLD  -pt is not taking any medications at this time  -Lipid panel done last week wnl  -c/w lifestyle modifications diet and exercise   -DASH diet     3- Preventive measure   -DVT proph: pt is ambulating   -no need for chemical AC     Dispo: Patient to remain inpatient for 14 days on active TB tx, thereafter can be discharged to home to f/u as an outpt.

## 2018-06-10 PROCEDURE — 99233 SBSQ HOSP IP/OBS HIGH 50: CPT | Mod: GC

## 2018-06-10 RX ADMIN — LORATADINE 10 MILLIGRAM(S): 10 TABLET ORAL at 13:09

## 2018-06-10 RX ADMIN — Medication 50 MILLIGRAM(S): at 13:08

## 2018-06-10 RX ADMIN — Medication 1 SPRAY(S): at 18:04

## 2018-06-10 RX ADMIN — ETHAMBUTOL HYDROCHLORIDE 1200 MILLIGRAM(S): 400 TABLET, FILM COATED ORAL at 13:09

## 2018-06-10 RX ADMIN — Medication 300 MILLIGRAM(S): at 13:09

## 2018-06-10 RX ADMIN — PANTOPRAZOLE SODIUM 40 MILLIGRAM(S): 20 TABLET, DELAYED RELEASE ORAL at 05:44

## 2018-06-10 RX ADMIN — PYRAZINAMIDE 1500 MILLIGRAM(S): 500 TABLET ORAL at 13:08

## 2018-06-10 RX ADMIN — Medication 1 SPRAY(S): at 05:44

## 2018-06-10 NOTE — PROGRESS NOTE ADULT - ASSESSMENT
58 y/o female with PMH previous 30yr alcohol abuse  (quit 1 yr ago), sent from [facility] clinic due to chronic productive cough, in the setting of positive Quantiferon, and hx of tb exposure.  Prior CXR with calcifications/lesions over right upper lobe.    Patient admitted for Hi risk of active tb.  Sputum AFB negative x3,  Bronchoscopy for BAL 2 smears negative.    Patient started on full tb regimen, requires 14 day inpatient tx as per gunjan.    Thereafter culture results will be followed and if negative will be transitioned to latent TB tx, is positive will be maintained on active TB tx regimen.     Today is day 6/14 of her inpatient treatment.  Her last day of treatment is Monday 6/18/18     1- QuantiFeron positive with RUL lesions/ calcifications and hx TB exposure and concern for active TB infection vs latent TB, high risk for TB   - patient is afebrile, hemodynamically stable, no hemoptysis, no numbness/tingling of extremities;   - c/w airborne precautions   - Pending  BAL culture  -4 drug TB regimen:    INH 300mg daily, Rifampin 600mg daily, Pyrazinamide 1500mg daily, Ethambutol 1200mg daily, Vit B6 50mg daily  -Will follow uric acid  and hepatic function panel intermittently   - ophthalmology consult appreciated  - ID f/u noted and appreciated and d/w GUNJAN for pt to remain on isolation and complete 14 days of tx inpatient prior to being released for disposition to c/w treatment as an outpt      2- Transaminitis  mild elevations, likely secondary to tb medications;  continue  monitoring;     3--HLD  -Lipid panel last week wnl  -c/w lifestyle modifications diet and exercise   -DASH diet     3- Preventive measure   -DVT proph: pt is ambulating   -no need for chemical AC     .

## 2018-06-10 NOTE — PROGRESS NOTE ADULT - SUBJECTIVE AND OBJECTIVE BOX
CC: Patient is a 57y old  Female who presents with a chief complaint of cough, positive QuantiFeron test and abnormal cxr, admitted to rule out active tuberculosis (02 Jun 2018 16:32)    Overnight/ AM events:  Patient reports no events overnight;  denies any hemoptysis, fever, weight loss, numbness or tingling of extremties;     Spoke with patient's daughter at bedside this morning and updated her on the status of her mothers treatment and that today is day 6/14 of her inpatient treatment.  Her last day of treatment is Monday 6/18/18     ROS: Denies any productive cough, SOB, hemoptysis, abdominal pain, diarrhea, constipation, isolated calf pain.    Vital Signs Last 24 Hrs  T(C): 36.7 (10 Eric 2018 16:45), Max: 37.1 (10 Eric 2018 05:00)  T(F): 98 (10 Eric 2018 16:45), Max: 98.8 (10 Eric 2018 05:00)  HR: 75 (10 Eric 2018 16:45) (67 - 75)  BP: 110/72 (10 Eric 2018 16:45) (110/65 - 118/81)  RR: 18 (10 Eric 2018 11:41) (18 - 18)  SpO2: 96% (10 Eric 2018 16:45) (96% - 96%)    Physical Exam:   Gen: Elderly female, laying in bed No acute distress.  HEENT: EOMI, PERRL, moist oral mucosa;    CVS:, +S1/S2, RRR  no murmurs,  Lungs: mild crackles appreciated in posterior upper lobes; ;  no accessory muscle use;  Abdomen: Normal bowel sounds, soft, nontender, nondistended. No palpable flank tenderness   Ext: No cyanosis, edema or calf tenderness  Neuro: AAOx3, no focal deficits.    MEDICATIONS  (STANDING):  ethambutol 1200 milliGRAM(s) Oral daily  fluticasone propionate 50 MICROgram(s)/spray Nasal Spray 1 Spray(s) Both Nostrils two times a day  isoniazid 300 milliGRAM(s) Oral daily  loratadine 10 milliGRAM(s) Oral daily  pantoprazole    Tablet 40 milliGRAM(s) Oral before breakfast  pyrazinamide 1500 milliGRAM(s) Oral daily  pyridoxine 50 milliGRAM(s) Oral daily  rifampin 600 milliGRAM(s) Oral daily    MEDICATIONS  (PRN):  acetaminophen   Tablet. 650 milliGRAM(s) Oral every 6 hours PRN Mild Pain (1 - 3)  ALBUTerol/ipratropium for Nebulization 3 milliLiter(s) Nebulizer every 6 hours PRN Shortness of Breath and/or Wheezing      MEDICATIONS  (PRN):  acetaminophen   Tablet. 650 milliGRAM(s) Oral every 6 hours PRN Mild Pain (1 - 3)  ALBUTerol/ipratropium for Nebulization 3 milliLiter(s) Nebulizer every 6 hours PRN Shortness of Breath and/or Wheezing  traMADol 25 milliGRAM(s) Oral every 8 hours PRN Moderate Pain (4 - 6)

## 2018-06-11 PROCEDURE — 99233 SBSQ HOSP IP/OBS HIGH 50: CPT | Mod: GC

## 2018-06-11 RX ADMIN — PYRAZINAMIDE 1500 MILLIGRAM(S): 500 TABLET ORAL at 12:57

## 2018-06-11 RX ADMIN — ETHAMBUTOL HYDROCHLORIDE 1200 MILLIGRAM(S): 400 TABLET, FILM COATED ORAL at 12:57

## 2018-06-11 RX ADMIN — Medication 1 SPRAY(S): at 17:51

## 2018-06-11 RX ADMIN — Medication 50 MILLIGRAM(S): at 12:57

## 2018-06-11 RX ADMIN — Medication 300 MILLIGRAM(S): at 12:57

## 2018-06-11 RX ADMIN — PANTOPRAZOLE SODIUM 40 MILLIGRAM(S): 20 TABLET, DELAYED RELEASE ORAL at 06:13

## 2018-06-11 RX ADMIN — LORATADINE 10 MILLIGRAM(S): 10 TABLET ORAL at 12:57

## 2018-06-11 RX ADMIN — Medication 1 SPRAY(S): at 06:13

## 2018-06-11 NOTE — PROGRESS NOTE ADULT - SUBJECTIVE AND OBJECTIVE BOX
CC: Patient is a 57y old  Female who presented with a chief complaint of cough, positive QuantiFeron test and abnormal cxr, admitted to rule out active tuberculosis (02 Jun 2018 16:32)    Overnight/ AM events:  Patient seen and examined at bedside. Currently with no complaints. She is tolerating PO intake, voiding and stooling without any issues.    ROS: Denies fever, chest pain, SOB, abdominal pain, diarrhea, constipation, isolated calf pain.    Vital Signs Last 24 Hrs  T(C): 36.8 (11 Jun 2018 05:28), Max: 36.8 (10 Eric 2018 23:00)  T(F): 98.3 (11 Jun 2018 05:28), Max: 98.3 (11 Jun 2018 05:28)  HR: 63 (11 Jun 2018 05:28) (63 - 75)  BP: 110/67 (11 Jun 2018 05:28) (104/64 - 118/81)  BP(mean): --  RR: 18 (11 Jun 2018 05:28) (18 - 18)  SpO2: 96% (11 Jun 2018 05:28) (96% - 96%)    Physical Exam:   Gen: No acute distress.  HEENT: Normocephalic, atraumatic, EOMI, PERRL.  CVS: Regular rate and rhythm, +S1/S2, no murmurs, rubs or gallops appreciated  Lungs: Normal respiratory rate and effort, decreased air entry but lungs are clear to auscultation, no wheeze, rales, rhonchi  Abdomen: Normal bowel sounds, soft, nontender, nondistended. No palpable flank tenderness or mass, no CVA tenderness  Ext: No cyanosis, edema or calf tenderness  Neuro: AAOx3, no focal deficits.    MEDICATIONS  (STANDING):  ethambutol 1200 milliGRAM(s) Oral daily  fluticasone propionate 50 MICROgram(s)/spray Nasal Spray 1 Spray(s) Both Nostrils two times a day  isoniazid 300 milliGRAM(s) Oral daily  loratadine 10 milliGRAM(s) Oral daily  pantoprazole    Tablet 40 milliGRAM(s) Oral before breakfast  pyrazinamide 1500 milliGRAM(s) Oral daily  pyridoxine 50 milliGRAM(s) Oral daily  rifampin 600 milliGRAM(s) Oral daily    MEDICATIONS  (PRN):  acetaminophen   Tablet. 650 milliGRAM(s) Oral every 6 hours PRN Mild Pain (1 - 3)  ALBUTerol/ipratropium for Nebulization 3 milliLiter(s) Nebulizer every 6 hours PRN Shortness of Breath and/or Wheezing

## 2018-06-11 NOTE — CHART NOTE - NSCHARTNOTEFT_GEN_A_CORE
Source: Patient [ ]  Family [ ]   other [x ]    Current Diet: Dash      PO intake:  < 50% [ ]   50-75%  [ ]   %  [x ]  other :    Source for PO intake [ ] Patient [ ] family [x ] chart [ ] staff [ ] other    Enteral /Parenteral Nutrition:     Current Weight: 5/29129.8#, 6/5 131.7#    % Weight Change     Pertinent Medications: MEDICATIONS  (STANDING):  ethambutol 1200 milliGRAM(s) Oral daily  fluticasone propionate 50 MICROgram(s)/spray Nasal Spray 1 Spray(s) Both Nostrils two times a day  isoniazid 300 milliGRAM(s) Oral daily  loratadine 10 milliGRAM(s) Oral daily  pantoprazole    Tablet 40 milliGRAM(s) Oral before breakfast  pyrazinamide 1500 milliGRAM(s) Oral daily  pyridoxine 50 milliGRAM(s) Oral daily  rifampin 600 milliGRAM(s) Oral daily    MEDICATIONS  (PRN):  acetaminophen   Tablet. 650 milliGRAM(s) Oral every 6 hours PRN Mild Pain (1 - 3)  ALBUTerol/ipratropium for Nebulization 3 milliLiter(s) Nebulizer every 6 hours PRN Shortness of Breath and/or Wheezing    Pertinent Labs:         Skin:     Nutrition focused physical exam not conducted - found signs of malnutrition [ ]absent [ ]present    Subcutaneous fat loss: [ ] Orbital fat pads region, [ ]Buccal fat region, [ ]Triceps region,  [ ]Ribs region    Muscle wasting: [ ]Temples region, [ ]Clavicle region, [ ]Shoulder region, [ ]Scapula region, [ ]Interosseous region,  [ ]thigh region, [ ]Calf region    Estimated Needs:   [x ] no change since previous assessment  [ ] recalculated:     Current Nutrition Diagnosis:   Pt's continues to eat 100% at meals, which is confirmed by RN flow sheets, tolerating well. No nutrition issues noted.   Pt needs to stay in hospital x 2 weeks as per MD note.     Recommendations: Continue to monitor po intake    Monitoring and Evaluation:   [x ] PO intake [x ] Tolerance to diet prescription [X] Weights  [X] Follow up per protocol [X] Labs:

## 2018-06-11 NOTE — PROGRESS NOTE ADULT - ASSESSMENT
57 y.o. F with hx of HLD and previous alcohol abuse for about 30 yrs (quit 1 yr ago), sent from [facility] clinic due to chronic cough associated with sputum production, positive Quantiferon test and hx of TB exposure in the past and prior CXR with calcifications/lesions over right upper lobe.  Pt was admitted due to high risk for active TB. S/p Sputum AFB negative x 3. ID consulted and recommended CT sx consultation for bronchoscopy for BAL. S/p procedure on 6/5/18 with negative AFB smear, pending official culture results in the interim on active Tb treatment.  Patient will actively be treated for 2 weeks total inpatient as per GUNJAN. Thereafter culture results will be followed and if negative will be transitioned to latent TB tx, is positive will be maintained on active TB tx regimen.     1- QuantiFeron positive with RUL lesions/ calcifications and hx TB exposure and concern for active TB infection vs latent TB, high risk for TB   - Patient with complaints of chronic cough no other complaints at this time   - Continue with airborne precautions as per GUNJAN  - Sputum culture x 3 collected, no AFB noted  - AFB smear negative from bronch  - Awaiting results of BAL culture which can take up to 4 weeks to return   - 4 drug TB regimen until culture results are back, if all work up is negative then at that point can convert treatment to Latent TB treatment.   -c/w INH 300mg daily, Rifampin 600mg daily, Pyrazinamide 1500mg daily, Ethambutol 1200mg daily, Vit B6 50mg daily started on 06/05/2017  -Medication s/e include hepatitis and neuropathy    -Will follow uric acid  and hepatic function panel intermittently   - ophthalmology consult appreciated  - ID f/u noted and appreciated and d/w GUNJAN for pt to complete 14 days of tx inpatient prior to being released for disposition to c/w treatment as an outpt      2-HLD  -pt is not taking any medications at this time  -Lipid panel done last week wnl  -c/w lifestyle modifications diet and exercise   -Low fat diet    3- Preventive measure   -DVT proph: pt is ambulating   -no need for chemical AC     Dispo: Patient to remain inpatient for 14 days on active TB tx, thereafter can be discharged to home to f/u as an outpt. 57 y.o. F with hx of HLD and previous alcohol abuse for about 30 yrs (quit 1 yr ago), sent from [facility] clinic due to chronic cough associated with sputum production, positive Quantiferon test and hx of TB exposure in the past and prior CXR with calcifications/lesions over right upper lobe.  Pt was admitted due to high risk for active TB. S/p Sputum AFB negative x 3. ID consulted and recommended CT sx consultation for bronchoscopy for BAL. S/p procedure on 6/5/18 with negative AFB smear, pending official culture results in the interim on active Tb treatment.  Patient will actively be treated for 2 weeks total inpatient as per GUNJAN. Thereafter culture results will be followed and if negative will be transitioned to latent TB tx, is positive will be maintained on active TB tx regimen.     QuantiFeron positive  -stable, asymptomatic  -present prior to admission in setting of concurring RUL lesions/ calcifications and hx TB exposure  -concern for active vs latent infection  - Sputum culture x 3 collected, neg AFB  - sp bronch w/ BAL, procedure well tolerated, uncomplicated, AFB smear negative from bronch  - Awaiting results of BAL culture which can take up to 4 weeks to return   - empirically on 4 drug TB regimen until culture results are back, if all work up is negative then at that point can convert treatment to Latent TB treatment.   -c/w INH 300mg daily, Rifampin 600mg daily, Pyrazinamide 1500mg daily, Ethambutol 1200mg daily, Vit B6 50mg daily started on 06/05/2017, to complete 14d course while inpatient, last day of admission to be 6/19 unless otherwise contraindicated  - ophthalmology consult appreciated as pt on ethambutal + concerns for dec visual acuity, noted to likely be sec to cataract/chronic visual issues rather than med se  -watch for other SE of hepatitis and neuropathy while on tx    -airbourne precautions  -Will follow uric acid  and hepatic function panel intermittently   - ID f/u appreciated, GUNJAN aware of case    2-HLD  -stable, chronic, uncomplicated  -pt is not taking any medications at this time  -Lipid panel done last week wnl  -c/w lifestyle modifications diet and exercise   -Low fat diet    3- Preventive measure   -DVT proph: pt is ambulating   -no need for chemical AC     Allergic Asthma  -stable, uncomplicated, w/o exac  -highly suspected dx given constellation of symptoms present prior to and during admission  -cont oral antihistamines  -duonebs prn inpatient, alb inhaler on dc  -nasal spray  -pulm follow up on dc for formal eval  -avoid allergens as much as possible      Dispo: Patient to remain inpatient for 14 days on active TB tx, thereafter can be discharged to home to f/u as an outpt.     Outpatient FU: PMD, ID, Pulm

## 2018-06-12 PROCEDURE — 99233 SBSQ HOSP IP/OBS HIGH 50: CPT | Mod: GC

## 2018-06-12 RX ADMIN — Medication 650 MILLIGRAM(S): at 05:08

## 2018-06-12 RX ADMIN — Medication 650 MILLIGRAM(S): at 06:00

## 2018-06-12 RX ADMIN — Medication 1 SPRAY(S): at 17:27

## 2018-06-12 RX ADMIN — Medication 300 MILLIGRAM(S): at 12:11

## 2018-06-12 RX ADMIN — Medication 50 MILLIGRAM(S): at 12:11

## 2018-06-12 RX ADMIN — Medication 1 SPRAY(S): at 05:11

## 2018-06-12 RX ADMIN — ETHAMBUTOL HYDROCHLORIDE 1200 MILLIGRAM(S): 400 TABLET, FILM COATED ORAL at 12:11

## 2018-06-12 RX ADMIN — LORATADINE 10 MILLIGRAM(S): 10 TABLET ORAL at 12:11

## 2018-06-12 RX ADMIN — PANTOPRAZOLE SODIUM 40 MILLIGRAM(S): 20 TABLET, DELAYED RELEASE ORAL at 05:13

## 2018-06-12 RX ADMIN — PYRAZINAMIDE 1500 MILLIGRAM(S): 500 TABLET ORAL at 12:11

## 2018-06-12 NOTE — PROGRESS NOTE ADULT - SUBJECTIVE AND OBJECTIVE BOX
MEDICATIONS  (STANDING):  ethambutol 1200 milliGRAM(s) Oral daily  fluticasone propionate 50 MICROgram(s)/spray Nasal Spray 1 Spray(s) Both Nostrils two times a day  isoniazid 300 milliGRAM(s) Oral daily  loratadine 10 milliGRAM(s) Oral daily  pantoprazole    Tablet 40 milliGRAM(s) Oral before breakfast  pyrazinamide 1500 milliGRAM(s) Oral daily  pyridoxine 50 milliGRAM(s) Oral daily  rifampin 600 milliGRAM(s) Oral daily    MEDICATIONS  (PRN):  acetaminophen   Tablet. 650 milliGRAM(s) Oral every 6 hours PRN Mild Pain (1 - 3)  ALBUTerol/ipratropium for Nebulization 3 milliLiter(s) Nebulizer every 6 hours PRN Shortness of Breath and/or Wheezing  CC: Patient is a 57y old  Female who presented with a chief complaint of cough, positive QuantiFeron test and abnormal cxr, admitted to rule out active tuberculosis (02 Jun 2018 16:32)    Overnight/ AM events:  Patient seen and examined at bedside. Currently with no complaints. She is tolerating PO intake, voiding and stooling without any issues.    ROS: Denies fever, chest pain, SOB, abdominal pain, diarrhea, constipation, isolated calf pain.    Vital Signs Last 24 Hrs  T(C): 36.7 (12 Jun 2018 05:00), Max: 36.8 (11 Jun 2018 09:40)  T(F): 98.1 (12 Jun 2018 05:00), Max: 98.2 (11 Jun 2018 09:40)  HR: 66 (12 Jun 2018 05:00) (64 - 75)  BP: 129/80 (12 Jun 2018 05:00) (108/64 - 129/80)  BP(mean): --  RR: 19 (12 Jun 2018 05:00) (14 - 19)  SpO2: 97% (12 Jun 2018 05:00) (97% - 99%)    Physical Exam:   Gen: No acute distress.  HEENT: Normocephalic, atraumatic, EOMI, PERRL.  CVS: Regular rate and rhythm, +S1/S2, no murmurs, rubs or gallops appreciated  Lungs: Normal respiratory rate and effort, decreased air entry but lungs are clear to auscultation, no wheeze, rales, rhonchi  Abdomen: Normal bowel sounds, soft, nontender, nondistended. No palpable flank tenderness or mass, no CVA tenderness  Ext: No cyanosis, edema or calf tenderness  Neuro: AAOx3, no focal deficits.

## 2018-06-12 NOTE — PROGRESS NOTE ADULT - ASSESSMENT
57 y.o. F with hx of HLD and previous alcohol abuse for about 30 yrs (quit 1 yr ago), sent from [facility] clinic due to chronic cough associated with sputum production, positive Quantiferon test and hx of TB exposure in the past and prior CXR with calcifications/lesions over right upper lobe.  Pt was admitted due to high risk for active TB. S/p Sputum AFB negative x 3. ID consulted and recommended CT sx consultation for bronchoscopy for BAL. S/p procedure on 6/5/18 with negative AFB smear, pending official culture results in the interim on active Tb treatment.  Patient will actively be treated for 2 weeks total inpatient as per GUNJAN. Thereafter culture results will be followed and if negative will be transitioned to latent TB tx, is positive will be maintained on active TB tx regimen.     QuantiFeron positive  -stable, asymptomatic  -present prior to admission in setting of concurring RUL lesions/ calcifications and hx TB exposure  -concern for active vs latent infection  - Sputum culture x 3 collected, neg AFB  - sp bronch w/ BAL, procedure well tolerated, uncomplicated, AFB smear negative from bronch  - Awaiting results of BAL culture which can take up to 4 weeks to return   - empirically on 4 drug TB regimen until culture results are back, if all work up is negative then at that point can convert treatment to Latent TB treatment.   -c/w INH 300mg daily, Rifampin 600mg daily, Pyrazinamide 1500mg daily, Ethambutol 1200mg daily, Vit B6 50mg daily started on 06/05/2017, to complete 14d course while inpatient, last day of admission to be 6/19 unless otherwise contraindicated  - ophthalmology consult appreciated as pt on ethambutal + concerns for dec visual acuity, noted to likely be sec to cataract/chronic visual issues rather than med se  -watch for other SE of hepatitis and neuropathy while on tx    -airbourne precautions  -Will follow uric acid  and hepatic function panel intermittently   - ID f/u appreciated, GUNJAN aware of case    2-HLD  -stable, chronic, uncomplicated  -pt is not taking any medications at this time  -Lipid panel done last week wnl  -c/w lifestyle modifications diet and exercise   -Low fat diet    3- Preventive measure   -DVT proph: pt is ambulating   -no need for chemical AC     Allergic Asthma  -stable, uncomplicated, w/o exac  -highly suspected dx given constellation of symptoms present prior to and during admission  -cont oral antihistamines  -duonebs prn inpatient, alb inhaler on dc  -nasal spray  -pulm follow up on dc for formal eval  -avoid allergens as much as possible      Dispo: Patient to remain inpatient for 14 days on active TB tx, thereafter can be discharged to home to f/u as an outpt.     Outpatient FU: PMD, ID, Pulm

## 2018-06-12 NOTE — PROGRESS NOTE ADULT - ATTENDING COMMENTS
Patient seen and examined at the bedside. Agree with the above history, physical, assessment, and plan with the necessary amendments/elaborations below:    clinically stable. no complaints, asymptomatic. cont weekly labs. cont RIPE therapy until cx resolved.

## 2018-06-13 PROCEDURE — 99233 SBSQ HOSP IP/OBS HIGH 50: CPT | Mod: GC

## 2018-06-13 PROCEDURE — 99222 1ST HOSP IP/OBS MODERATE 55: CPT

## 2018-06-13 RX ORDER — PYRIDOXINE HCL (VITAMIN B6) 100 MG
100 TABLET ORAL DAILY
Qty: 0 | Refills: 0 | Status: DISCONTINUED | OUTPATIENT
Start: 2018-06-13 | End: 2018-06-18

## 2018-06-13 RX ORDER — ALPRAZOLAM 0.25 MG
0.25 TABLET ORAL
Qty: 0 | Refills: 0 | Status: DISCONTINUED | OUTPATIENT
Start: 2018-06-13 | End: 2018-06-18

## 2018-06-13 RX ADMIN — Medication 300 MILLIGRAM(S): at 12:28

## 2018-06-13 RX ADMIN — Medication 1 SPRAY(S): at 17:27

## 2018-06-13 RX ADMIN — ETHAMBUTOL HYDROCHLORIDE 1200 MILLIGRAM(S): 400 TABLET, FILM COATED ORAL at 12:28

## 2018-06-13 RX ADMIN — PYRAZINAMIDE 1500 MILLIGRAM(S): 500 TABLET ORAL at 12:28

## 2018-06-13 RX ADMIN — PANTOPRAZOLE SODIUM 40 MILLIGRAM(S): 20 TABLET, DELAYED RELEASE ORAL at 05:39

## 2018-06-13 RX ADMIN — Medication 50 MILLIGRAM(S): at 12:28

## 2018-06-13 NOTE — PROGRESS NOTE ADULT - ASSESSMENT
57 y.o. F with hx of HLD and previous alcohol abuse for about 30 yrs (quit 1 yr ago), sent from [facility] clinic due to chronic cough associated with sputum production, positive Quantiferon test and hx of TB exposure in the past and prior CXR with calcifications/lesions over right upper lobe.  Pt was admitted due to high risk for active TB. S/p Sputum AFB negative x 3. ID consulted and recommended CT sx consultation for bronchoscopy for BAL. S/p procedure on 6/5/18 with negative AFB smear, pending official culture results in the interim on active Tb treatment.  Patient will actively be treated for 2 weeks total inpatient as per GUNJAN. Thereafter culture results will be followed and if negative will be transitioned to latent TB tx, is positive will be maintained on active TB tx regimen.     QuantiFeron positive  -stable, asymptomatic  -present prior to admission in setting of concurring RUL lesions/ calcifications and hx TB exposure  -concern for active vs latent infection  - Sputum culture x 3 collected, neg AFB  - sp bronch w/ BAL, procedure well tolerated, uncomplicated, AFB smear negative from bronch  - Awaiting results of BAL culture which can take up to 4 weeks to return   - empirically on 4 drug TB regimen until culture results are back, if all work up is negative then at that point can convert treatment to Latent TB treatment.   -c/w INH 300mg daily, Rifampin 600mg daily, Pyrazinamide 1500mg daily, Ethambutol 1200mg daily, Vit B6 50mg daily started on 06/05/2017, to complete 14d course while inpatient, last day of admission to be 6/19 unless otherwise contraindicated  - ophthalmology consult appreciated as pt on ethambutal + concerns for dec visual acuity, noted to likely be sec to cataract/chronic visual issues rather than med se  -watch for other SE of hepatitis and neuropathy while on tx    -airbourne precautions  -Will follow uric acid  and hepatic function panel intermittently   - ID f/u appreciated, GUNJAN aware of case    2-HLD  -stable, chronic, uncomplicated  -pt is not taking any medications at this time  -Lipid panel done last week wnl  -c/w lifestyle modifications diet and exercise   -Low fat diet    3- Preventive measure   -DVT proph: pt is ambulating   -no need for chemical AC     Allergic Asthma  -stable, uncomplicated, w/o exac  -highly suspected dx given constellation of symptoms present prior to and during admission  -cont oral antihistamines  -duonebs prn inpatient, alb inhaler on dc  -nasal spray  -pulm follow up on dc for formal eval  -avoid allergens as much as possible      Dispo: Patient to remain inpatient for 14 days on active TB tx, thereafter can be discharged to home to f/u as an outpt.     Outpatient FU: PMD, ID, Pulm 57 y.o. F with hx of HLD and previous alcohol abuse for about 30 yrs (quit 1 yr ago), sent from [facility] clinic due to chronic cough associated with sputum production, positive QuantiFeron test and hx of TB exposure in the past and prior CXR with calcifications/lesions over right upper lobe.     QuantiFeron positive  -stable, asymptomatic  -present prior to admission in setting of concurring RUL lesions/ calcifications and hx TB exposure  -concern for active vs latent infection  - Sputum culture x 3 collected, neg AFB  - sp bronch w/ BAL, procedure well tolerated, uncomplicated, AFB smear negative from bronch  - Awaiting results of BAL culture which can take up to 4 weeks to return   - empirically on 4 drug TB regimen until culture results are back, if all work up is negative then at that point can convert treatment to Latent TB treatment.   -c/w INH 300mg daily, Rifampin 600mg daily, Pyrazinamide 1500mg daily, Ethambutol 1200mg daily, Vit B6 50mg daily started on 06/05/2017, to complete 14d course while inpatient, last day of admission to be 6/19 unless otherwise contraindicated  - ophthalmology consult appreciated as pt on ethambutal + concerns for dec visual acuity, noted to likely be sec to cataract/chronic visual issues rather than med se  -neuro consult for BL LE neuropathy appreciated, pt denied presence during their eval, increased B6 from 50 to 100mg, cont to watch closely for neuropathy from tx  -watch also for other SE such as hepatitis  -airbourne precautions  -Will follow uric acid  and hepatic function panel intermittently   - ID f/u appreciated, GUNJAN aware of case    2-HLD  -stable, chronic, uncomplicated  -pt is not taking any medications at this time  -Lipid panel done last week wnl  -c/w lifestyle modifications diet and exercise   -Low fat diet    3- Preventive measure   -DVT proph: pt is ambulating   -no need for chemical AC     Allergic Asthma  -stable, uncomplicated, w/o exac  -highly suspected dx given constellation of symptoms present prior to and during admission  -cont oral antihistamines  -duonebs prn inpatient, alb inhaler on dc  -nasal spray  -pulm follow up on dc for formal eval  -avoid allergens as much as possible    Dispo: Patient to remain inpatient for 14 days on active TB tx, thereafter can be discharged to home to f/u as an outpt.     Outpatient FU: PMD, ID, Pulm 57 y.o. F with hx of HLD and previous alcohol abuse for about 30 yrs (quit 1 yr ago), sent from [facility] clinic due to chronic cough associated with sputum production, positive QuantiFeron test and hx of TB exposure in the past and prior CXR with calcifications/lesions over right upper lobe.     QuantiFeron positive  -stable, asymptomatic  -present prior to admission in setting of concurring RUL lesions/ calcifications and hx TB exposure  -concern for active vs latent infection  - Sputum culture x 3 collected, neg AFB  - sp bronch w/ BAL, procedure well tolerated, uncomplicated, AFB smear negative from bronch  - Awaiting results of BAL culture which can take up to 4 weeks to return   - empirically on 4 drug TB regimen until culture results are back, if all work up is negative then at that point can convert treatment to Latent TB treatment.   -c/w INH 300mg daily, Rifampin 600mg daily, Pyrazinamide 1500mg daily, Ethambutol 1200mg daily, Vit B6 50mg daily started on 06/05/2017, to complete 14d course while inpatient, last day of admission to be 6/19 unless otherwise contraindicated  - ophthalmology consult appreciated as pt on ethambutal + concerns for dec visual acuity, noted to likely be sec to cataract/chronic visual issues rather than med se  -neuro consult for BL LE neuropathy appreciated, pt denied presence during their eval, increased B6 from 50 to 100mg, cont to watch closely for neuropathy from tx  -watch also for other SE such as hepatitis  -airbourne precautions  -Will follow uric acid  and hepatic function panel intermittently   - ID f/u appreciated, GUNJAN aware of case    2-HLD  -stable, chronic, uncomplicated  -pt is not taking any medications at this time  -Lipid panel done last week wnl  -c/w lifestyle modifications diet and exercise   -Low fat diet    3- Preventive measure   -DVT proph: pt is ambulating   -no need for chemical AC     Allergic Asthma  -stable, uncomplicated, w/o exac  -highly suspected dx given constellation of symptoms present prior to and during admission  -cont oral antihistamines  -duonebs prn inpatient, alb inhaler on dc  -nasal spray  -pulm follow up on dc for formal eval  -avoid allergens as much as possible    Anxiety  -pt with onset during hospitalization sec to feeling "caged in" while on isolation  -low dose xanax prn, reassurance provided @ bedside, will follow    Dispo: Patient to remain inpatient for 14 days on active TB tx, thereafter can be discharged to home to f/u as an outpt.     Outpatient FU: PMD, ID, Pulm

## 2018-06-13 NOTE — CONSULT NOTE ADULT - SUBJECTIVE AND OBJECTIVE BOX
[facility]                                     Neurology at [place]                                 Magda Fallon, & Evaristo                                  [address]                                        [place], [place], [address]                                             [phone]    HISTORY:  HPI:  Pt is 58 yo F with PMH of HLD and previous alcohol abuse for about 30 yrs ( quit 1 yr ago ). 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 Pt denies hx of smoking or second had smoking, but she does report hx of wood use for cooking for about 40 yrs. 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 Pt was sent to [facility] for evaluation and tx. Recently she was prescribe a PPI and Claritin but these medications did not help with her cough. Pt denies fever, chest pain , abdominal pain , weight loss, diarrhea, or sick contacts. Pt has been traveling back and forward to [facility]. Three of her family members tested positives with QuantiFeron test and they were already tx for a total of 9 months . (29 May 2018 22:08)    The patient is a 57y Female admitted with TB, currently treated with ethambutol, INH, rifampin and B6.  She complained to Medicine team of numbness in legs this morning.  I was asked to evaluate this, however when I came in to evaluate her she denied any numbness or paresthesia multiple times.  She did c/o of neck pian and headache and muscle tension.  Neuro eval is requested    PAST MEDICAL & SURGICAL HISTORY:  Hyperlipidemia  Alcohol abuse  S/P hernia repair      MEDICATIONS  (STANDING):  ethambutol 1200 milliGRAM(s) Oral daily  fluticasone propionate 50 MICROgram(s)/spray Nasal Spray 1 Spray(s) Both Nostrils two times a day  isoniazid 300 milliGRAM(s) Oral daily  loratadine 10 milliGRAM(s) Oral daily  pantoprazole    Tablet 40 milliGRAM(s) Oral before breakfast  pyrazinamide 1500 milliGRAM(s) Oral daily  pyridoxine 100 milliGRAM(s) Oral daily  rifampin 600 milliGRAM(s) Oral daily    MEDICATIONS  (PRN):  acetaminophen   Tablet. 650 milliGRAM(s) Oral every 6 hours PRN Mild Pain (1 - 3)  ALBUTerol/ipratropium for Nebulization 3 milliLiter(s) Nebulizer every 6 hours PRN Shortness of Breath and/or Wheezing  ALPRAZolam 0.25 milliGRAM(s) Oral two times a day PRN amxiety/depression      Allergies    No Known Allergies    Intolerances      SOCIAL HISTORY:  denied tob, alcohol or drugs, mold h/o EtOH abuse per chart    FAMILY HISTORY:  No pertinent family history in first degree relatives      ROS:  ROS: 14 point ROS negative other than what is present in HPI or below  neck pain/headache    Vital Signs Last 24 Hrs  T(C): 36.5 (13 Jun 2018 09:49), Max: 36.6 (12 Jun 2018 17:25)  T(F): 97.7 (13 Jun 2018 09:49), Max: 97.9 (12 Jun 2018 17:25)  HR: 74 (13 Jun 2018 09:49) (63 - 74)  BP: 114/70 (13 Jun 2018 09:49) (110/64 - 130/80)  BP(mean): --  RR: 18 (13 Jun 2018 09:49) (16 - 18)  SpO2: 97% (13 Jun 2018 09:49) (97% - 98%)      General: NAD    Detailed Neurologic Exam:    Mental status: The patient is awake and alert and has normal attention span.  The patient is fully oriented in 3 spheres. The patient is oriented to current events. The patient is able to name objects, follow commands, repeat sentences.    Cranial nerves: . Pupils equal and react symmetrically to light. There is no visual field deficit to confrontation. Extraocular motion is full with no nystagmus. There is no ptosis. Facial sensation is intact. Facial musculature is symmetric. Palate elevates symmetrically. Shoulder shrug is normal. Tongue is midline.    Motor: There is normal bulk and tone.  There is no tremor.  Strength is 5/5 in the right arm and leg.   Strength is 5/5 in the left arm and leg.    Sensation: Intact to light touch and pin in 4 extremities    Reflexes: 2+ throughout and plantar responses are flexor.    Cerebellar: There is no dysmetria on finger to nose testing.    Gait : ambulates well in room, no abnormaliy    LABS:     no labs today    RADIOLOGY & ADDITIONAL STUDIES (independently reviewed unless otherwise noted):  Recent neurological studies (images reviewed independently): none [facility]                                     Neurology at [place]                                 Magda Fallon, & Evaristo                                  [address]                                        [place], [place], [address]                                             [phone]    HISTORY:  HPI: The patient is a 57y Female admitted with TB, currently treated with ethambutol, INH, rifampin and B6.  She complained to Medicine team of numbness in legs this morning.  I was asked to evaluate this, however when I came in to evaluate her she denied any numbness or paresthesia multiple times.  She did c/o of neck pian and headache and muscle tension.  Neuro eval is requested    PAST MEDICAL & SURGICAL HISTORY:  Hyperlipidemia  Alcohol abuse  S/P hernia repair      MEDICATIONS  (STANDING):  ethambutol 1200 milliGRAM(s) Oral daily  fluticasone propionate 50 MICROgram(s)/spray Nasal Spray 1 Spray(s) Both Nostrils two times a day  isoniazid 300 milliGRAM(s) Oral daily  loratadine 10 milliGRAM(s) Oral daily  pantoprazole    Tablet 40 milliGRAM(s) Oral before breakfast  pyrazinamide 1500 milliGRAM(s) Oral daily  pyridoxine 100 milliGRAM(s) Oral daily  rifampin 600 milliGRAM(s) Oral daily    MEDICATIONS  (PRN):  acetaminophen   Tablet. 650 milliGRAM(s) Oral every 6 hours PRN Mild Pain (1 - 3)  ALBUTerol/ipratropium for Nebulization 3 milliLiter(s) Nebulizer every 6 hours PRN Shortness of Breath and/or Wheezing  ALPRAZolam 0.25 milliGRAM(s) Oral two times a day PRN amxiety/depression      Allergies    No Known Allergies    Intolerances      SOCIAL HISTORY:  denied tob, alcohol or drugs, mold h/o EtOH abuse per chart    FAMILY HISTORY:  No pertinent family history in first degree relatives      ROS:  ROS: 14 point ROS negative other than what is present in HPI or below  neck pain/headache    Vital Signs Last 24 Hrs  T(C): 36.5 (13 Jun 2018 09:49), Max: 36.6 (12 Jun 2018 17:25)  T(F): 97.7 (13 Jun 2018 09:49), Max: 97.9 (12 Jun 2018 17:25)  HR: 74 (13 Jun 2018 09:49) (63 - 74)  BP: 114/70 (13 Jun 2018 09:49) (110/64 - 130/80)  BP(mean): --  RR: 18 (13 Jun 2018 09:49) (16 - 18)  SpO2: 97% (13 Jun 2018 09:49) (97% - 98%)      General: NAD    Detailed Neurologic Exam:    Mental status: The patient is awake and alert and has normal attention span.  The patient is fully oriented in 3 spheres. The patient is oriented to current events. The patient is able to name objects, follow commands, repeat sentences.    Cranial nerves: . Pupils equal and react symmetrically to light. There is no visual field deficit to confrontation. Extraocular motion is full with no nystagmus. There is no ptosis. Facial sensation is intact. Facial musculature is symmetric. Palate elevates symmetrically. Shoulder shrug is normal. Tongue is midline.    Motor: There is normal bulk and tone.  There is no tremor.  Strength is 5/5 in the right arm and leg.   Strength is 5/5 in the left arm and leg.    Sensation: Intact to light touch and pin in 4 extremities    Reflexes: 2+ throughout and plantar responses are flexor.    Cerebellar: There is no dysmetria on finger to nose testing.    Gait : ambulates well in room, no abnormaliy    LABS:     no labs today    RADIOLOGY & ADDITIONAL STUDIES (independently reviewed unless otherwise noted):  Recent neurological studies (images reviewed independently): none

## 2018-06-13 NOTE — CONSULT NOTE ADULT - CONSULT REASON
Chronic cough
Pt on 4-drug therapy for TB, please evaluate as on Ethambutol
R/O TB
paresthesia
r/o TB

## 2018-06-13 NOTE — PROGRESS NOTE ADULT - SUBJECTIVE AND OBJECTIVE BOX
CC: Patient is a 57y old  Female who presented with a chief complaint of cough, positive QuantiFeron test and abnormal cxr, admitted to rule out active tuberculosis (02 Jun 2018 16:32)    Overnight/ AM events:  Patient seen and examined at bedside. Currently with no complaints. She is tolerating PO intake, voiding and stooling without any issues.    ROS: Denies fever, chest pain, SOB, abdominal pain, diarrhea, constipation, isolated calf pain.    Vital Signs Last 24 Hrs  T(C): 36.4 (13 Jun 2018 04:00), Max: 36.8 (12 Jun 2018 08:59)  T(F): 97.6 (13 Jun 2018 04:00), Max: 98.3 (12 Jun 2018 08:59)  HR: 65 (13 Jun 2018 04:00) (63 - 70)  BP: 128/75 (13 Jun 2018 04:00) (108/64 - 130/80)  BP(mean): --  RR: 18 (13 Jun 2018 04:00) (14 - 18)  SpO2: 98% (13 Jun 2018 04:00) (95% - 98%)    Physical Exam:   Gen: No acute distress.  HEENT: Normocephalic, atraumatic, EOMI, PERRL.  CVS: Regular rate and rhythm, +S1/S2, no murmurs, rubs or gallops appreciated  Lungs: Normal respiratory rate and effort, decreased air entry but lungs are clear to auscultation, no wheeze, rales, rhonchi  Abdomen: Normal bowel sounds, soft, nontender, nondistended. No palpable flank tenderness or mass, no CVA tenderness  Ext: No cyanosis, edema or calf tenderness  Neuro: AAOx3, no focal deficits.    MEDICATIONS  (STANDING):  ethambutol 1200 milliGRAM(s) Oral daily  fluticasone propionate 50 MICROgram(s)/spray Nasal Spray 1 Spray(s) Both Nostrils two times a day  isoniazid 300 milliGRAM(s) Oral daily  loratadine 10 milliGRAM(s) Oral daily  pantoprazole    Tablet 40 milliGRAM(s) Oral before breakfast  pyrazinamide 1500 milliGRAM(s) Oral daily  pyridoxine 50 milliGRAM(s) Oral daily  rifampin 600 milliGRAM(s) Oral daily    MEDICATIONS  (PRN):  acetaminophen   Tablet. 650 milliGRAM(s) Oral every 6 hours PRN Mild Pain (1 - 3)  ALBUTerol/ipratropium for Nebulization 3 milliLiter(s) Nebulizer every 6 hours PRN Shortness of Breath and/or Wheezing

## 2018-06-13 NOTE — CONSULT NOTE ADULT - ASSESSMENT
The patient is a 57y Female who is followed by neurology because of numbness    Neuropathy  She c/o paresthesia earlier, albeit not to me in the afternoon  ethambutol and isoniazid can both cause neuropathy.  needs to complete course of treatment  consider adjustment of B6    TB  treatment per ID    will follow with you    Jeff Man MD PhD   [id number] The patient is a 57y Female who is followed by neurology because of numbness    Neuropathy  She c/o paresthesia earlier, albeit not to me in the afternoon  ethambutol and isoniazid can both cause neuropathy.  needs to complete course of treatment  consider adjustment of B6    TB  treatment per ID    will follow with you    Jeff Man MD PhD   [id number]

## 2018-06-14 LAB
ANION GAP SERPL CALC-SCNC: 10 MMOL/L — SIGNIFICANT CHANGE UP (ref 5–17)
ANION GAP SERPL CALC-SCNC: 13 MMOL/L — SIGNIFICANT CHANGE UP (ref 5–17)
BASOPHILS # BLD AUTO: 0 K/UL — SIGNIFICANT CHANGE UP (ref 0–0.2)
BASOPHILS NFR BLD AUTO: 0.8 % — SIGNIFICANT CHANGE UP (ref 0–2)
BUN SERPL-MCNC: 17 MG/DL — SIGNIFICANT CHANGE UP (ref 8–20)
BUN SERPL-MCNC: 18 MG/DL — SIGNIFICANT CHANGE UP (ref 8–20)
CALCIUM SERPL-MCNC: 9.6 MG/DL — SIGNIFICANT CHANGE UP (ref 8.6–10.2)
CALCIUM SERPL-MCNC: 9.9 MG/DL — SIGNIFICANT CHANGE UP (ref 8.6–10.2)
CHLORIDE SERPL-SCNC: 100 MMOL/L — SIGNIFICANT CHANGE UP (ref 98–107)
CHLORIDE SERPL-SCNC: 101 MMOL/L — SIGNIFICANT CHANGE UP (ref 98–107)
CO2 SERPL-SCNC: 28 MMOL/L — SIGNIFICANT CHANGE UP (ref 22–29)
CO2 SERPL-SCNC: 30 MMOL/L — HIGH (ref 22–29)
CREAT SERPL-MCNC: 0.58 MG/DL — SIGNIFICANT CHANGE UP (ref 0.5–1.3)
CREAT SERPL-MCNC: 0.64 MG/DL — SIGNIFICANT CHANGE UP (ref 0.5–1.3)
EOSINOPHIL # BLD AUTO: 0.7 K/UL — HIGH (ref 0–0.5)
EOSINOPHIL NFR BLD AUTO: 13.4 % — HIGH (ref 0–6)
GLUCOSE SERPL-MCNC: 108 MG/DL — SIGNIFICANT CHANGE UP (ref 70–115)
GLUCOSE SERPL-MCNC: 99 MG/DL — SIGNIFICANT CHANGE UP (ref 70–115)
HCT VFR BLD CALC: 38.1 % — SIGNIFICANT CHANGE UP (ref 37–47)
HGB BLD-MCNC: 12.2 G/DL — SIGNIFICANT CHANGE UP (ref 12–16)
LYMPHOCYTES # BLD AUTO: 1.7 K/UL — SIGNIFICANT CHANGE UP (ref 1–4.8)
LYMPHOCYTES # BLD AUTO: 33.2 % — SIGNIFICANT CHANGE UP (ref 20–55)
MAGNESIUM SERPL-MCNC: 2.1 MG/DL — SIGNIFICANT CHANGE UP (ref 1.6–2.6)
MCHC RBC-ENTMCNC: 28.2 PG — SIGNIFICANT CHANGE UP (ref 27–31)
MCHC RBC-ENTMCNC: 32 G/DL — SIGNIFICANT CHANGE UP (ref 32–36)
MCV RBC AUTO: 88 FL — SIGNIFICANT CHANGE UP (ref 81–99)
MONOCYTES # BLD AUTO: 0.4 K/UL — SIGNIFICANT CHANGE UP (ref 0–0.8)
MONOCYTES NFR BLD AUTO: 7.1 % — SIGNIFICANT CHANGE UP (ref 3–10)
NEUTROPHILS # BLD AUTO: 2.4 K/UL — SIGNIFICANT CHANGE UP (ref 1.8–8)
NEUTROPHILS NFR BLD AUTO: 45.3 % — SIGNIFICANT CHANGE UP (ref 37–73)
PHOSPHATE SERPL-MCNC: 4.2 MG/DL — SIGNIFICANT CHANGE UP (ref 2.4–4.7)
PLATELET # BLD AUTO: 229 K/UL — SIGNIFICANT CHANGE UP (ref 150–400)
POTASSIUM SERPL-MCNC: 4.2 MMOL/L — SIGNIFICANT CHANGE UP (ref 3.5–5.3)
POTASSIUM SERPL-MCNC: 5.5 MMOL/L — HIGH (ref 3.5–5.3)
POTASSIUM SERPL-SCNC: 4.2 MMOL/L — SIGNIFICANT CHANGE UP (ref 3.5–5.3)
POTASSIUM SERPL-SCNC: 5.5 MMOL/L — HIGH (ref 3.5–5.3)
RBC # BLD: 4.33 M/UL — LOW (ref 4.4–5.2)
RBC # FLD: 13.7 % — SIGNIFICANT CHANGE UP (ref 11–15.6)
SODIUM SERPL-SCNC: 141 MMOL/L — SIGNIFICANT CHANGE UP (ref 135–145)
SODIUM SERPL-SCNC: 141 MMOL/L — SIGNIFICANT CHANGE UP (ref 135–145)
WBC # BLD: 5.2 K/UL — SIGNIFICANT CHANGE UP (ref 4.8–10.8)
WBC # FLD AUTO: 5.2 K/UL — SIGNIFICANT CHANGE UP (ref 4.8–10.8)

## 2018-06-14 PROCEDURE — 99233 SBSQ HOSP IP/OBS HIGH 50: CPT | Mod: GC

## 2018-06-14 RX ADMIN — Medication 100 MILLIGRAM(S): at 12:30

## 2018-06-14 RX ADMIN — LORATADINE 10 MILLIGRAM(S): 10 TABLET ORAL at 12:30

## 2018-06-14 RX ADMIN — Medication 300 MILLIGRAM(S): at 12:30

## 2018-06-14 RX ADMIN — PANTOPRAZOLE SODIUM 40 MILLIGRAM(S): 20 TABLET, DELAYED RELEASE ORAL at 05:32

## 2018-06-14 RX ADMIN — ETHAMBUTOL HYDROCHLORIDE 1200 MILLIGRAM(S): 400 TABLET, FILM COATED ORAL at 12:30

## 2018-06-14 RX ADMIN — PYRAZINAMIDE 1500 MILLIGRAM(S): 500 TABLET ORAL at 12:30

## 2018-06-14 NOTE — PROGRESS NOTE ADULT - ATTENDING COMMENTS
Patient seen and examined at the bedside. Agree with the above history, physical, assessment, and plan with the necessary amendments/elaborations below;    stable, asymptomatic. cleared by GUNJAN for dc home 6/18. appt for follow up in clinic + with ID to be made first. pt to be discharged with scripts for meds. NOT to take meds on day of follow up appt with ID.

## 2018-06-14 NOTE — PROGRESS NOTE ADULT - ASSESSMENT
57 y.o. F with hx of HLD and previous alcohol abuse for about 30 yrs (quit 1 yr ago), sent from [facility] clinic due to chronic cough associated with sputum production, positive QuantiFeron test and hx of TB exposure in the past and prior CXR with calcifications/lesions over right upper lobe.     QuantiFeron positive  -stable, asymptomatic  -present prior to admission in setting of concurring RUL lesions/ calcifications and hx TB exposure  -concern for active vs latent infection  - Sputum culture x 3 collected, neg AFB  - sp bronch w/ BAL, procedure well tolerated, uncomplicated, AFB smear negative from bronch  - Awaiting results of BAL culture which can take up to 4 weeks to return   - empirically on 4 drug TB regimen until culture results are back, if all work up is negative then at that point can convert treatment to Latent TB treatment.   -c/w INH 300mg daily, Rifampin 600mg daily, Pyrazinamide 1500mg daily, Ethambutol 1200mg daily, Vit B6 50mg daily started on 06/05/2017, to complete 14d course while inpatient, last day of admission to be 6/19 unless otherwise contraindicated  - ophthalmology consult appreciated as pt on ethambutal + concerns for dec visual acuity, noted to likely be sec to cataract/chronic visual issues rather than med se  -neuro consult for BL LE neuropathy appreciated, pt denied presence during their eval, increased B6 from 50 to 100mg, cont to watch closely for neuropathy from tx  -watch also for other SE such as hepatitis  -airbourne precautions  -Will follow uric acid  and hepatic function panel intermittently   - ID f/u appreciated, GUNJAN aware of case    2-HLD  -stable, chronic, uncomplicated  -pt is not taking any medications at this time  -Lipid panel done last week wnl  -c/w lifestyle modifications diet and exercise   -Low fat diet    3- Preventive measure   -DVT proph: pt is ambulating   -no need for chemical AC     Allergic Asthma  -stable, uncomplicated, w/o exac  -highly suspected dx given constellation of symptoms present prior to and during admission  -cont oral antihistamines  -duonebs prn inpatient, alb inhaler on dc  -nasal spray  -pulm follow up on dc for formal eval  -avoid allergens as much as possible    Anxiety  -pt with onset during hospitalization sec to feeling "caged in" while on isolation  -low dose xanax prn, reassurance provided @ bedside, will follow    Dispo: Patient to remain inpatient for 14 days on active TB tx, thereafter can be discharged to home to f/u as an outpt.     Outpatient FU: PMD, ID, Pulm

## 2018-06-15 LAB
ALBUMIN SERPL ELPH-MCNC: 4.1 G/DL — SIGNIFICANT CHANGE UP (ref 3.3–5.2)
ALP SERPL-CCNC: 117 U/L — SIGNIFICANT CHANGE UP (ref 40–120)
ALT FLD-CCNC: 20 U/L — SIGNIFICANT CHANGE UP
ANION GAP SERPL CALC-SCNC: 14 MMOL/L — SIGNIFICANT CHANGE UP (ref 5–17)
AST SERPL-CCNC: 21 U/L — SIGNIFICANT CHANGE UP
BILIRUB SERPL-MCNC: <0.2 MG/DL — LOW (ref 0.4–2)
BUN SERPL-MCNC: 21 MG/DL — HIGH (ref 8–20)
CALCIUM SERPL-MCNC: 9.7 MG/DL — SIGNIFICANT CHANGE UP (ref 8.6–10.2)
CHLORIDE SERPL-SCNC: 101 MMOL/L — SIGNIFICANT CHANGE UP (ref 98–107)
CO2 SERPL-SCNC: 27 MMOL/L — SIGNIFICANT CHANGE UP (ref 22–29)
CREAT SERPL-MCNC: 0.69 MG/DL — SIGNIFICANT CHANGE UP (ref 0.5–1.3)
GLUCOSE SERPL-MCNC: 95 MG/DL — SIGNIFICANT CHANGE UP (ref 70–115)
POTASSIUM SERPL-MCNC: 4.5 MMOL/L — SIGNIFICANT CHANGE UP (ref 3.5–5.3)
POTASSIUM SERPL-SCNC: 4.5 MMOL/L — SIGNIFICANT CHANGE UP (ref 3.5–5.3)
PROT SERPL-MCNC: 7.3 G/DL — SIGNIFICANT CHANGE UP (ref 6.6–8.7)
SODIUM SERPL-SCNC: 142 MMOL/L — SIGNIFICANT CHANGE UP (ref 135–145)

## 2018-06-15 PROCEDURE — 99233 SBSQ HOSP IP/OBS HIGH 50: CPT | Mod: GC

## 2018-06-15 PROCEDURE — 99232 SBSQ HOSP IP/OBS MODERATE 35: CPT

## 2018-06-15 RX ORDER — PYRAZINAMIDE 500 MG/1
3 TABLET ORAL
Qty: 21 | Refills: 0 | OUTPATIENT
Start: 2018-06-15 | End: 2018-06-21

## 2018-06-15 RX ORDER — HEXAVITAMINS
1 TABLET ORAL
Qty: 7 | Refills: 0 | OUTPATIENT
Start: 2018-06-15 | End: 2018-06-21

## 2018-06-15 RX ORDER — ETHAMBUTOL HYDROCHLORIDE 400 MG/1
3 TABLET, FILM COATED ORAL
Qty: 21 | Refills: 0 | OUTPATIENT
Start: 2018-06-15 | End: 2018-06-21

## 2018-06-15 RX ADMIN — Medication 100 MILLIGRAM(S): at 14:45

## 2018-06-15 RX ADMIN — PYRAZINAMIDE 1500 MILLIGRAM(S): 500 TABLET ORAL at 14:45

## 2018-06-15 RX ADMIN — ETHAMBUTOL HYDROCHLORIDE 1200 MILLIGRAM(S): 400 TABLET, FILM COATED ORAL at 14:44

## 2018-06-15 RX ADMIN — Medication 1 SPRAY(S): at 18:25

## 2018-06-15 RX ADMIN — Medication 300 MILLIGRAM(S): at 14:44

## 2018-06-15 RX ADMIN — PANTOPRAZOLE SODIUM 40 MILLIGRAM(S): 20 TABLET, DELAYED RELEASE ORAL at 06:17

## 2018-06-15 RX ADMIN — Medication 1 SPRAY(S): at 06:17

## 2018-06-15 RX ADMIN — LORATADINE 10 MILLIGRAM(S): 10 TABLET ORAL at 14:44

## 2018-06-15 NOTE — PROGRESS NOTE ADULT - ASSESSMENT
56 y/o woman with PMH of HLD and alcohol abuse in the past was admitted with positive QuantiFeron test, cough and sputum for about 2 years with some weight loss and night sweats.   She had contact with possible TB cases about 15-20 years ago.   on treatment for TB

## 2018-06-15 NOTE — PROGRESS NOTE ADULT - SUBJECTIVE AND OBJECTIVE BOX
[facility]  INFECTIOUS DISEASES AND INTERNAL MEDICINE at [place]  =======================================================  Ha Callahan MD  Diplomates American Board of Internal Medicine and Infectious Diseases  =======================================================    ERIC DONOVAN [id number]  chief complaint: follow up tuberculosis    patient seen and examined in follow up.  Chart and labs reviewed.   no fevers noted on chart.   =======================================================  Allergies:  double portion rice and veg at lunch and din (Unknown)  No Known Allergies      =======================================================  Medications:  acetaminophen   Tablet. 650 milliGRAM(s) Oral every 6 hours PRN  ALBUTerol/ipratropium for Nebulization 3 milliLiter(s) Nebulizer every 6 hours PRN  ALPRAZolam 0.25 milliGRAM(s) Oral two times a day PRN  ethambutol 1200 milliGRAM(s) Oral daily  fluticasone propionate 50 MICROgram(s)/spray Nasal Spray 1 Spray(s) Both Nostrils two times a day  isoniazid 300 milliGRAM(s) Oral daily  loratadine 10 milliGRAM(s) Oral daily  pantoprazole    Tablet 40 milliGRAM(s) Oral before breakfast  pyrazinamide 1500 milliGRAM(s) Oral daily  pyridoxine 100 milliGRAM(s) Oral daily  rifampin 600 milliGRAM(s) Oral daily       =======================================================     REVIEW OF SYSTEMS:  as above  all other ROS are negative    =======================================================    Physical Exam:    Vital Signs Last 24 Hrs  T(C): 36.6 (15 Eric 2018 04:00), Max: 36.7 (14 Jun 2018 23:07)  T(F): 97.9 (15 Eric 2018 04:00), Max: 98.1 (14 Jun 2018 23:07)  HR: 67 (15 Eric 2018 04:00) (67 - 72)  BP: 110/74 (15 Eric 2018 04:00) (110/74 - 131/84)  RR: 18 (15 Eric 2018 04:00) (16 - 18)  SpO2: 97% (14 Jun 2018 23:07) (97% - 97%)    GEN: NAD, pleasant  HEENT: normocephalic and atraumatic. EOMI. LINDSEY.    NECK: Supple. No carotid bruits.  No lymphadenopathy or thyromegaly.  LUNGS:    HEART: Regular rate and rhythm    ABDOMEN: Soft, nontender, and nondistended.  Positive bowel sounds.    : No CVA tenderness  EXTREMITIES: Without any cyanosis, clubbing, rash, lesions or edema.  MSK: no joint swelling  NEUROLOGIC: Cranial nerves II through XII are grossly intact.  PSYCHIATRIC: Appropriate affect .  SKIN: No ulceration or induration present.    =======================================================    Labs:  06-15    142  |  101  |  21.0<H>  ----------------------------<  95  4.5   |  27.0  |  0.69    Ca    9.7      15 Eric 2018 06:42  Phos  4.2     06-14  Mg     2.1     06-14    TPro  7.3  /  Alb  4.1  /  TBili  <0.2<L>  /  DBili  x   /  AST  21  /  ALT  20  /  AlkPhos  117  06-15                          12.2   5.2   )-----------( 229      ( 14 Jun 2018 07:23 )             38.1           LIVER FUNCTIONS - ( 15 Eric 2018 06:42 )  Alb: 4.1 g/dL / Pro: 7.3 g/dL / ALK PHOS: 117 U/L / ALT: 20 U/L / AST: 21 U/L / GGT: x               CAPILLARY BLOOD GLUCOSE            RECENT CULTURES:  06-08 @ 20:26 .Sputum Sputum           06-06 @ 01:44 .Broncial     No growth at 1 week.        06-05 @ 14:55 .Broncial Bronchial Lavage Haemophilus parainfluenzae    Numerous Haemophilus parainfluenzae  Beta lactamase negative  Rare Routine respiratory negro present    Few White blood cells  No organisms seen [facility]  INFECTIOUS DISEASES AND INTERNAL MEDICINE at [place]  =======================================================  Ha Burrell MD [facility]   Rebel Callahan MD  Diplomates American Board of Internal Medicine and Infectious Diseases  =======================================================    ERIC DONOVAN [id number]  chief complaint: follow up tuberculosis    patient seen and examined in follow up.  Chart and labs reviewed.   no fevers noted on chart.   no new complaints.    no cough.     =======================================================  Allergies:  double portion rice and veg at lunch and din (Unknown)  No Known Allergies    =======================================================  Medications:  acetaminophen   Tablet. 650 milliGRAM(s) Oral every 6 hours PRN  ALBUTerol/ipratropium for Nebulization 3 milliLiter(s) Nebulizer every 6 hours PRN  ALPRAZolam 0.25 milliGRAM(s) Oral two times a day PRN  ethambutol 1200 milliGRAM(s) Oral daily  fluticasone propionate 50 MICROgram(s)/spray Nasal Spray 1 Spray(s) Both Nostrils two times a day  isoniazid 300 milliGRAM(s) Oral daily  loratadine 10 milliGRAM(s) Oral daily  pantoprazole    Tablet 40 milliGRAM(s) Oral before breakfast  pyrazinamide 1500 milliGRAM(s) Oral daily  pyridoxine 100 milliGRAM(s) Oral daily  rifampin 600 milliGRAM(s) Oral daily    =======================================================  REVIEW OF SYSTEMS:  as above  all other ROS are negative    =======================================================    Physical Exam:  Vital Signs Last 24 Hrs  T(C): 36.6 (15 Eric 2018 04:00), Max: 36.7 (14 Jun 2018 23:07)  T(F): 97.9 (15 Eric 2018 04:00), Max: 98.1 (14 Jun 2018 23:07)  HR: 67 (15 Eric 2018 04:00) (67 - 72)  BP: 110/74 (15 Eric 2018 04:00) (110/74 - 131/84)  RR: 18 (15 Eric 2018 04:00) (16 - 18)  SpO2: 97% (14 Jun 2018 23:07) (97% - 97%)    GEN: NAD, pleasant  HEENT: normocephalic and atraumatic. EOMI. LINDSEY.    NECK: Supple. No carotid bruits.  No lymphadenopathy or thyromegaly.  LUNGS:  good air entry bilaterally   HEART: Regular rate and rhythm    ABDOMEN: Soft, nontender, and nondistended.  Positive bowel sounds.    : No CVA tenderness  EXTREMITIES: Without any cyanosis, clubbing, rash, lesions or edema.  MSK: no joint swelling  NEUROLOGIC: Cranial nerves II through XII are grossly intact.  PSYCHIATRIC: Appropriate affect .  SKIN: No ulceration or induration present.    =======================================================    Labs:  06-15    142  |  101  |  21.0<H>  ----------------------------<  95  4.5   |  27.0  |  0.69    Ca    9.7      15 Eric 2018 06:42  Phos  4.2     06-14  Mg     2.1     06-14    TPro  7.3  /  Alb  4.1  /  TBili  <0.2<L>  /  DBili  x   /  AST  21  /  ALT  20  /  AlkPhos  117  06-15                          12.2   5.2   )-----------( 229      ( 14 Jun 2018 07:23 )             38.1         LIVER FUNCTIONS - ( 15 Eric 2018 06:42 )  Alb: 4.1 g/dL / Pro: 7.3 g/dL / ALK PHOS: 117 U/L / ALT: 20 U/L / AST: 21 U/L / GGT: x                 RECENT CULTURES:  06-08 @ 20:26 .Sputum Sputum            06-06 @ 01:44 .Broncial     No growth at 1 week.        06-05 @ 14:55 .Broncial Bronchial Lavage Haemophilus parainfluenzae    Numerous Haemophilus parainfluenzae  Beta lactamase negative  Rare Routine respiratory negro present    Few White blood cells  No organisms seen

## 2018-06-15 NOTE — PROGRESS NOTE ADULT - ATTENDING COMMENTS
Note addended where needed. Plan discussed with patient with Cymro speaking resident. Offered to call family member to give update on plan of care, declined. Plan d/w ID also

## 2018-06-15 NOTE — PROGRESS NOTE ADULT - ASSESSMENT
57 y.o. F with hx of HLD and previous alcohol abuse for about 30 yrs (quit 1 yr ago), sent from [facility] clinic due to chronic cough associated with sputum production, positive QuantiFeron test and hx of TB exposure in the past and prior CXR with calcifications/lesions over right upper lobe.     QuantiFeron positive  -stable, asymptomatic  -present prior to admission in setting of concurring RUL lesions/ calcifications and hx TB exposure  -concern for active vs latent infection  - Sputum culture x 3 collected, neg AFB  - sp bronch w/ BAL, procedure well tolerated, uncomplicated, AFB smear negative from bronch  - Awaiting results of BAL culture, so far only H. influenzae (likely to represent respiratory negro) has grown.  - empirically on 4 drug TB regimen until culture results are back, if all work up is negative then at that point can convert treatment to Latent TB treatment.   -c/w INH 300mg daily, Rifampin 600mg daily, Pyrazinamide 1500mg daily, Ethambutol 1200mg daily, Vit B6 50mg daily started on 06/05/2017, to complete 14d course while inpatient, last day of admission to be 6/19 unless otherwise contraindicated  - ophthalmology consult appreciated as pt on ethambutal + concerns for dec visual acuity, noted to likely be sec to cataract/chronic visual issues rather than med se  -neuro consult for BL LE neuropathy appreciated, pt denied presence during their eval, increased B6 from 50 to 100mg, cont to watch closely for neuropathy from tx  -watch also for other SE such as hepatitis  -airbourne precautions  -Will follow uric acid  and hepatic function panel intermittently   - ID f/u appreciated, GUNJAN aware of case    2-HLD  -stable, chronic, uncomplicated  -pt is not taking any medications at this time  -Lipid panel done last week wnl  -c/w lifestyle modifications diet and exercise   -Low fat diet    3- Preventive measure   -DVT proph: pt is ambulating   -no need for chemical AC     Allergic Asthma  -stable, uncomplicated, w/o exac  -highly suspected dx given constellation of symptoms present prior to and during admission  -cont oral antihistamines  -duonebs prn inpatient, alb inhaler on dc  -nasal spray  -pulm follow up on dc for formal eval  -avoid allergens as much as possible    Anxiety  -pt with onset during hospitalization sec to feeling "caged in" while on isolation  -low dose xanax prn, reassurance provided @ bedside, will follow    Dispo: Patient to remain inpatient for 14 days on active TB tx, thereafter can be discharged to home to f/u as an outpt.     Outpatient FU: PMD, ID, Pulm 57 y.o. F with hx of HLD and previous alcohol abuse for about 30 yrs (quit 1 yr ago), sent from [facility] clinic due to chronic cough associated with sputum production, positive QuantiFeron test and hx of TB exposure in the past and prior CXR with calcifications/lesions over right upper lobe.     1.QuantiFeron positive  -stable, asymptomatic  -present prior to admission in setting of concurring RUL lesions/ calcifications and hx TB exposure  -concern for active vs latent infection  - Sputum culture x 3 collected, neg AFB  - sp bronch w/ BAL, procedure well tolerated, uncomplicated, AFB smear negative from bronch  - Awaiting results of BAL culture, so far only H. influenzae (likely to represent respiratory negro) has grown.  - empirically on 4 drug TB regimen until culture results are back, if all work up is negative then at that point can convert treatment to Latent TB treatment.   -c/w INH 300mg daily, Rifampin 600mg daily, Pyrazinamide 1500mg daily, Ethambutol 1200mg daily, Vit B6 50mg daily started on 06/05/2017, to complete 14d course while inpatient, last day of admission to be 6/19 unless otherwise contraindicated  - ophthalmology consult appreciated as pt on ethambutal + concerns for dec visual acuity, noted to likely be sec to cataract/chronic visual issues rather than med se  -neuro consult for BL LE neuropathy appreciated, pt denied presence during their eval, increased B6 from 50 to 100mg, cont to watch closely for neuropathy from tx  -watch also for other SE such as hepatitis  -airbourne precautions  -Will follow uric acid  and hepatic function panel intermittently   - ID f/u appreciated, GUNJAN aware of case    2-HLD  -stable, chronic, uncomplicated  -pt is not taking any medications at this time  -Lipid panel done last week wnl  -c/w lifestyle modifications diet and exercise   -Low fat diet    3. Allergic Asthma  -stable, uncomplicated, w/o exac  -highly suspected dx given constellation of symptoms present prior to and during admission  -cont oral antihistamines  -duonebs prn inpatient, alb inhaler on dc  -nasal spray  -pulm follow up on dc for formal eval  -avoid allergens as much as possible    4. Anxiety  -pt with onset during hospitalization sec to feeling "caged in" while on isolation  -low dose xanax prn, reassurance provided @ bedside, will follow    5- Preventive measure   -DVT proph: pt is ambulating   -no need for chemical AC     Dispo: Patient to remain inpatient for 14 days on active TB tx, thereafter can be discharged to home to f/u as an outpt. Planned d/c on 6/18    Outpatient FU: PMD, ID, Pulm

## 2018-06-15 NOTE — PROGRESS NOTE ADULT - PROBLEM SELECTOR PLAN 1
1-R/O TB:  -Sputum AFB smear x 3 neg, follow up the cultures.    continue INH 300mg daily, Rifampin 600mg daily, Pyrazinamide 1500mg daily, Ethambutol 1200mg daily, Vit B6 50mg daily.   LFTs stable

## 2018-06-16 LAB
ALBUMIN SERPL ELPH-MCNC: 4.1 G/DL — SIGNIFICANT CHANGE UP (ref 3.3–5.2)
ALP SERPL-CCNC: 123 U/L — HIGH (ref 40–120)
ALT FLD-CCNC: 21 U/L — SIGNIFICANT CHANGE UP
ANION GAP SERPL CALC-SCNC: 13 MMOL/L — SIGNIFICANT CHANGE UP (ref 5–17)
AST SERPL-CCNC: 23 U/L — SIGNIFICANT CHANGE UP
BASOPHILS # BLD AUTO: 0 K/UL — SIGNIFICANT CHANGE UP (ref 0–0.2)
BASOPHILS NFR BLD AUTO: 0.4 % — SIGNIFICANT CHANGE UP (ref 0–2)
BILIRUB SERPL-MCNC: 0.2 MG/DL — LOW (ref 0.4–2)
BUN SERPL-MCNC: 13 MG/DL — SIGNIFICANT CHANGE UP (ref 8–20)
CALCIUM SERPL-MCNC: 9.4 MG/DL — SIGNIFICANT CHANGE UP (ref 8.6–10.2)
CHLORIDE SERPL-SCNC: 101 MMOL/L — SIGNIFICANT CHANGE UP (ref 98–107)
CO2 SERPL-SCNC: 27 MMOL/L — SIGNIFICANT CHANGE UP (ref 22–29)
CREAT SERPL-MCNC: 0.6 MG/DL — SIGNIFICANT CHANGE UP (ref 0.5–1.3)
EOSINOPHIL # BLD AUTO: 0.6 K/UL — HIGH (ref 0–0.5)
EOSINOPHIL NFR BLD AUTO: 11.8 % — HIGH (ref 0–6)
GLUCOSE SERPL-MCNC: 96 MG/DL — SIGNIFICANT CHANGE UP (ref 70–115)
HCT VFR BLD CALC: 38.1 % — SIGNIFICANT CHANGE UP (ref 37–47)
HGB BLD-MCNC: 12.1 G/DL — SIGNIFICANT CHANGE UP (ref 12–16)
LYMPHOCYTES # BLD AUTO: 1.6 K/UL — SIGNIFICANT CHANGE UP (ref 1–4.8)
LYMPHOCYTES # BLD AUTO: 28.8 % — SIGNIFICANT CHANGE UP (ref 20–55)
MCHC RBC-ENTMCNC: 28.1 PG — SIGNIFICANT CHANGE UP (ref 27–31)
MCHC RBC-ENTMCNC: 31.8 G/DL — LOW (ref 32–36)
MCV RBC AUTO: 88.6 FL — SIGNIFICANT CHANGE UP (ref 81–99)
MONOCYTES # BLD AUTO: 0.3 K/UL — SIGNIFICANT CHANGE UP (ref 0–0.8)
MONOCYTES NFR BLD AUTO: 6.3 % — SIGNIFICANT CHANGE UP (ref 3–10)
NEUTROPHILS # BLD AUTO: 2.8 K/UL — SIGNIFICANT CHANGE UP (ref 1.8–8)
NEUTROPHILS NFR BLD AUTO: 52.5 % — SIGNIFICANT CHANGE UP (ref 37–73)
PLATELET # BLD AUTO: 276 K/UL — SIGNIFICANT CHANGE UP (ref 150–400)
POTASSIUM SERPL-MCNC: 4.8 MMOL/L — SIGNIFICANT CHANGE UP (ref 3.5–5.3)
POTASSIUM SERPL-SCNC: 4.8 MMOL/L — SIGNIFICANT CHANGE UP (ref 3.5–5.3)
PROT SERPL-MCNC: 7.6 G/DL — SIGNIFICANT CHANGE UP (ref 6.6–8.7)
RBC # BLD: 4.3 M/UL — LOW (ref 4.4–5.2)
RBC # FLD: 13.8 % — SIGNIFICANT CHANGE UP (ref 11–15.6)
SODIUM SERPL-SCNC: 141 MMOL/L — SIGNIFICANT CHANGE UP (ref 135–145)
WBC # BLD: 5.4 K/UL — SIGNIFICANT CHANGE UP (ref 4.8–10.8)
WBC # FLD AUTO: 5.4 K/UL — SIGNIFICANT CHANGE UP (ref 4.8–10.8)

## 2018-06-16 PROCEDURE — 99232 SBSQ HOSP IP/OBS MODERATE 35: CPT | Mod: GC

## 2018-06-16 RX ADMIN — PANTOPRAZOLE SODIUM 40 MILLIGRAM(S): 20 TABLET, DELAYED RELEASE ORAL at 05:36

## 2018-06-16 RX ADMIN — Medication 1 SPRAY(S): at 19:20

## 2018-06-16 RX ADMIN — PYRAZINAMIDE 1500 MILLIGRAM(S): 500 TABLET ORAL at 13:32

## 2018-06-16 RX ADMIN — Medication 1 SPRAY(S): at 05:36

## 2018-06-16 RX ADMIN — Medication 100 MILLIGRAM(S): at 13:31

## 2018-06-16 RX ADMIN — Medication 300 MILLIGRAM(S): at 13:31

## 2018-06-16 RX ADMIN — ETHAMBUTOL HYDROCHLORIDE 1200 MILLIGRAM(S): 400 TABLET, FILM COATED ORAL at 13:32

## 2018-06-16 RX ADMIN — LORATADINE 10 MILLIGRAM(S): 10 TABLET ORAL at 13:31

## 2018-06-16 NOTE — PROGRESS NOTE ADULT - SUBJECTIVE AND OBJECTIVE BOX
CC: Patient is a 57y old  Female who presented with a chief complaint of cough, positive QuantiFeron test and abnormal cxr, admitted to rule out active tuberculosis (02 Jun 2018 16:32)    Patient seen and examined at bedside. Currently with no complaints. She is tolerating PO intake, voiding and stooling without any issues. She has some discolored urine (from medications)     ROS: Denies fever, chest pain, SOB, abdominal pain, diarrhea, constipation, isolated calf pain. All rest of ROs negative     Vital Signs Last 24 Hrs  T(C): 37 (16 Jun 2018 05:36), Max: 37 (16 Jun 2018 05:36)  T(F): 98.6 (16 Jun 2018 05:36), Max: 98.6 (16 Jun 2018 05:36)  HR: 71 (16 Jun 2018 05:36) (71 - 77)  BP: 119/77 (16 Jun 2018 05:36) (102/60 - 123/78)  RR: 18 (16 Jun 2018 05:36) (17 - 18)  SpO2: 100% (16 Jun 2018 05:36) (96% - 100%)    Physical Exam:   Gen: No acute distress.  HEENT: Normocephalic, atraumatic, EOMI, PERRL.  CVS: Regular rate and rhythm, +S1/S2, no murmurs, rubs or gallops appreciated  Lungs: Normal respiratory rate and effort, decreased air entry but lungs are clear to auscultation, no wheeze, rales, rhonchi  Abdomen: Normal bowel sounds, soft, nontender, nondistended. No palpable flank tenderness or mass, no CVA tenderness  Ext: No cyanosis, edema or calf tenderness  Neuro: AAOx3, no focal deficits.    Labs:                         12.1   5.4   )-----------( 276      ( 16 Jun 2018 06:37 )             38.1   06-16    141  |  101  |  13.0  ----------------------------<  96  4.8   |  27.0  |  0.60    Ca    9.4      16 Jun 2018 06:37    TPro  7.6  /  Alb  4.1  /  TBili  0.2<L>  /  DBili  x   /  AST  23  /  ALT  21  /  AlkPhos  123<H>  06-16    MEDICATIONS  (STANDING):  ethambutol 1200 milliGRAM(s) Oral daily  fluticasone propionate 50 MICROgram(s)/spray Nasal Spray 1 Spray(s) Both Nostrils two times a day  isoniazid 300 milliGRAM(s) Oral daily  loratadine 10 milliGRAM(s) Oral daily  pantoprazole    Tablet 40 milliGRAM(s) Oral before breakfast  pyrazinamide 1500 milliGRAM(s) Oral daily  pyridoxine 100 milliGRAM(s) Oral daily  rifampin 600 milliGRAM(s) Oral daily    MEDICATIONS  (PRN):  acetaminophen   Tablet. 650 milliGRAM(s) Oral every 6 hours PRN Mild Pain (1 - 3)  ALBUTerol/ipratropium for Nebulization 3 milliLiter(s) Nebulizer every 6 hours PRN Shortness of Breath and/or Wheezing  ALPRAZolam 0.25 milliGRAM(s) Oral two times a day PRN amxiety/depression

## 2018-06-16 NOTE — PROGRESS NOTE ADULT - ASSESSMENT
57 y.o. F with hx of HLD and previous alcohol abuse for about 30 yrs (quit 1 yr ago), sent from [facility] clinic due to chronic cough associated with sputum production, positive QuantiFeron test and hx of TB exposure in the past and prior CXR with calcifications/lesions over right upper lobe.     1.QuantiFeron positive  -stable, asymptomatic. -concern for active vs latent infection, -airbourne precautions  -present prior to admission in setting of concurring RUL lesions/ calcifications and hx TB exposure  - Sputum culture x 3 collected, neg AFB  - sp bronch w/ BAL, procedure well tolerated, uncomplicated, AFB smear negative from bronch  - Awaiting results of BAL culture, so far only H. influenzae (likely to represent respiratory negro) has grown.  - empirically on 4 drug TB regimen until culture results are back, if all work up is negative then at that point can convert treatment to Latent TB treatment.   -c/w INH 300mg daily, Rifampin 600mg daily, Pyrazinamide 1500mg daily, Ethambutol 1200mg daily, Vit B6 50mg daily started on 06/05/2017, to complete 14d course while inpatient, last day of admission to be 6/19 unless otherwise contraindicated  - ophthalmology consult appreciated as pt on ethambutol + concerns for dec visual acuity, noted to likely be sec to cataract/chronic visual issues rather than med se  -neuro consult for BL LE neuropathy appreciated, pt denied presence during their eval, increased B6 from 50 to 100mg, cont to watch closely for neuropathy from tx  -watch also for other SE such as hepatitis  -Will follow uric acid  and hepatic function panel intermittently   - ID f/u appreciated, GUNJAN aware of case    2-HLD  -stable, chronic, uncomplicated  -pt is not taking any medications at this time  -Lipid panel done last week wnl  -c/w lifestyle modifications diet and exercise   -Low fat diet    3. Allergic Asthma  -stable, uncomplicated, w/o exac  -highly suspected dx given constellation of symptoms present prior to and during admission  -cont oral antihistamines, nasal spray  -duonebs prn inpatient, alb inhaler on dc  -pulm follow up on dc for formal eval    4. Anxiety  -pt with onset during hospitalization sec to feeling "caged in" while on isolation  -low dose xanax prn, reassurance provided @ bedside, will follow    5- Preventive measure   -DVT proph: pt is ambulating   -no need for chemical AC     Dispo: Patient to remain inpatient for 14 days on active TB tx, thereafter can be discharged to home to f/u as an outpt. Planned d/c on 6/18    Outpatient FU: PMD, ID, Pulm  Clinic appt for [facility] done for 6/21.   Offered to call the family, stated that she will speak to them when she sees them, they come daily

## 2018-06-17 PROCEDURE — 99232 SBSQ HOSP IP/OBS MODERATE 35: CPT | Mod: GC

## 2018-06-17 RX ADMIN — Medication 100 MILLIGRAM(S): at 12:36

## 2018-06-17 RX ADMIN — Medication 1 SPRAY(S): at 05:17

## 2018-06-17 RX ADMIN — PYRAZINAMIDE 1500 MILLIGRAM(S): 500 TABLET ORAL at 12:36

## 2018-06-17 RX ADMIN — LORATADINE 10 MILLIGRAM(S): 10 TABLET ORAL at 12:36

## 2018-06-17 RX ADMIN — Medication 1 SPRAY(S): at 17:48

## 2018-06-17 RX ADMIN — PANTOPRAZOLE SODIUM 40 MILLIGRAM(S): 20 TABLET, DELAYED RELEASE ORAL at 05:16

## 2018-06-17 RX ADMIN — ETHAMBUTOL HYDROCHLORIDE 1200 MILLIGRAM(S): 400 TABLET, FILM COATED ORAL at 12:36

## 2018-06-17 RX ADMIN — Medication 300 MILLIGRAM(S): at 12:36

## 2018-06-17 NOTE — PROGRESS NOTE ADULT - SUBJECTIVE AND OBJECTIVE BOX
Brian    CC: Patient is a 57y old  Female who presented with a chief complaint of cough, positive QuantiFeron test and abnormal cxr, admitted to rule out active tuberculosis (02 Jun 2018 16:32)    Patient seen and examined at bedside. Currently with no complaints. She is tolerating PO intake, voiding and stooling.  She reports being ready to go home, feeling anxious, and not wanting anyone to contact her family.       ROS: Denies fever, chest pain, SOB, abdominal pain, diarrhea, constipation, isolated calf pain. All rest of ROs negative     Vital Signs Last 24 Hrs  T(C): 36.6 (17 Jun 2018 05:21), Max: 36.8 (16 Jun 2018 17:26)  T(F): 97.8 (17 Jun 2018 05:21), Max: 98.3 (16 Jun 2018 17:26)  HR: 94 (17 Jun 2018 05:21) (72 - 94)  BP: 112/71 (17 Jun 2018 05:21) (112/71 - 124/86)  RR: 18 (17 Jun 2018 05:21) (18 - 18)  SpO2: 99% (16 Jun 2018 21:15) (99% - 99%)    Physical Exam:   Gen: No acute distress.  HEENT: Normocephalic, atraumatic, EOMI, PERRL.  CVS: RRR, +S1/S2, no murmurs, rubs or gallops appreciated  Lungs: Normal respiratory rate and effort, no wheeze, rales, rhonchi  Abdomen: Normal bowel sounds, soft, nontender, nondistended. No palpable flank tenderness or mass, no CVA tenderness  Ext: No cyanosis, edema or calf tenderness  Neuro: AAOx3, no focal deficits.    Labs:                         12.1   5.4   )-----------( 276      ( 16 Jun 2018 06:37 )             38.1   06-16    141  |  101  |  13.0  ----------------------------<  96  4.8   |  27.0  |  0.60    Ca    9.4      16 Jun 2018 06:37    TPro  7.6  /  Alb  4.1  /  TBili  0.2<L>  /  DBili  x   /  AST  23  /  ALT  21  /  AlkPhos  123<H>  06-16    MEDICATIONS  (STANDING):  ethambutol 1200 milliGRAM(s) Oral daily  fluticasone propionate 50 MICROgram(s)/spray Nasal Spray 1 Spray(s) Both Nostrils two times a day  isoniazid 300 milliGRAM(s) Oral daily  loratadine 10 milliGRAM(s) Oral daily  pantoprazole    Tablet 40 milliGRAM(s) Oral before breakfast  pyrazinamide 1500 milliGRAM(s) Oral daily  pyridoxine 100 milliGRAM(s) Oral daily  rifampin 600 milliGRAM(s) Oral daily    MEDICATIONS  (PRN):  acetaminophen   Tablet. 650 milliGRAM(s) Oral every 6 hours PRN Mild Pain (1 - 3)  ALBUTerol/ipratropium for Nebulization 3 milliLiter(s) Nebulizer every 6 hours PRN Shortness of Breath and/or Wheezing  ALPRAZolam 0.25 milliGRAM(s) Oral two times a day PRN amxiety/depression CC: Patient is a 57y old  Female who presented with a chief complaint of cough, positive QuantiFeron test and abnormal cxr, admitted to rule out active tuberculosis (02 Jun 2018 16:32)    Patient seen and examined at bedside. Currently with no complaints. She is tolerating PO intake, voiding and stooling.  She reports being ready to go home, feeling anxious, and not wanting anyone to contact her family.   She feels weak b/c she can't move around.     ROS: Denies fever, chest pain, SOB, abdominal pain, diarrhea, constipation, isolated calf pain. All rest of ROs negative     Vital Signs Last 24 Hrs  T(C): 36.6 (17 Jun 2018 05:21), Max: 36.8 (16 Jun 2018 17:26)  T(F): 97.8 (17 Jun 2018 05:21), Max: 98.3 (16 Jun 2018 17:26)  HR: 94 (17 Jun 2018 05:21) (72 - 94)  BP: 112/71 (17 Jun 2018 05:21) (112/71 - 124/86)  RR: 18 (17 Jun 2018 05:21) (18 - 18)  SpO2: 99% (16 Jun 2018 21:15) (99% - 99%)    Physical Exam:   Gen: No acute distress.  HEENT: Normocephalic, atraumatic, EOMI, PERRL.  CVS: RRR, +S1/S2, no murmurs, rubs or gallops appreciated  Lungs: Normal respiratory rate and effort, no wheeze, rales, rhonchi  Abdomen: Normal bowel sounds, soft, nontender, nondistended. No palpable flank tenderness or mass, no CVA tenderness  Ext: No cyanosis, edema or calf tenderness  Neuro: AAOx3, no focal deficits.    Labs:                         12.1   5.4   )-----------( 276      ( 16 Jun 2018 06:37 )             38.1   06-16    141  |  101  |  13.0  ----------------------------<  96  4.8   |  27.0  |  0.60    Ca    9.4      16 Jun 2018 06:37    TPro  7.6  /  Alb  4.1  /  TBili  0.2<L>  /  DBili  x   /  AST  23  /  ALT  21  /  AlkPhos  123<H>  06-16    MEDICATIONS  (STANDING):  ethambutol 1200 milliGRAM(s) Oral daily  fluticasone propionate 50 MICROgram(s)/spray Nasal Spray 1 Spray(s) Both Nostrils two times a day  isoniazid 300 milliGRAM(s) Oral daily  loratadine 10 milliGRAM(s) Oral daily  pantoprazole    Tablet 40 milliGRAM(s) Oral before breakfast  pyrazinamide 1500 milliGRAM(s) Oral daily  pyridoxine 100 milliGRAM(s) Oral daily  rifampin 600 milliGRAM(s) Oral daily    MEDICATIONS  (PRN):  acetaminophen   Tablet. 650 milliGRAM(s) Oral every 6 hours PRN Mild Pain (1 - 3)  ALBUTerol/ipratropium for Nebulization 3 milliLiter(s) Nebulizer every 6 hours PRN Shortness of Breath and/or Wheezing  ALPRAZolam 0.25 milliGRAM(s) Oral two times a day PRN amxiety/depression

## 2018-06-17 NOTE — PROGRESS NOTE ADULT - ASSESSMENT
57 y.o. F with hx of HLD and previous alcohol abuse for about 30 yrs (quit 1 yr ago), sent from [facility] clinic due to chronic cough associated with sputum production, positive QuantiFeron test and hx of TB exposure in the past and prior CXR with calcifications/lesions over right upper lobe.     Patient will leave on Monday per Infection control;   they will coordinate with [facility];      1.QuantiFeron positive  -Afebrile, Hemodynamically stable;   -active vs latent infection, continue airborne precautions  -present prior to admission in setting of concurring RUL lesions/ calcifications and hx TB exposure  - Sputum culture x 3 collected, neg sputum AFB  - sp bronch w/ BAL, uncomplicated, AFB smear negative from bronch;    -  BAL culture, so far only H. parafluenzae (likely to represent respiratory negro);  no further findings in BAL Culture'   - empirically on 4 drug TB regimen until culture results are back, if all work up is negative then at that point can convert treatment to Latent TB treatment.   -c/w INH 300mg daily, Rifampin 600mg daily, Pyrazinamide 1500mg daily, Ethambutol 1200mg daily, Vit B6 50mg daily started on 06/05/2017, to complete 14d course while inpatient, last day of admission to be 6/19 unless otherwise contraindicated  - ophthalmology consult appreciated as pt on ethambutol + concerns for dec visual acuity, noted to likely be sec to cataract/chronic visual issues rather than med se  -neuro consult for BL LE neuropathy appreciated, pt denied presence during their eval, increased B6 from 50 to 100mg, cont to watch closely for neuropathy from tx  -watch also for other SE such as hepatitis;  last alt/ast wnl;  6/16/18;     -Will follow uric acid  and hepatic function panel intermittently   - ID and GUNJAN on board;      2-HLD  -stable, chronic;  last weeks lipid panel wnl    -pt is not taking any medications at this time  -c/w lifestyle modifications diet and exercise   -Low fat diet    3. Allergic Asthma  -stable, uncomplicated, w/o exac  -highly suspected dx given constellation of symptoms present prior to and during admission  -cont oral antihistamines, nasal spray  -duonebs prn inpatient, alb inhaler on dc  -pulm follow up on dc for formal eval    4. Anxiety  -pt with onset during hospitalization sec to feeling "caged in" while on isolation;  Xanax helping with symptoms;     -low dose xanax prn, reassurance provided @ bedside, will follow    5- Preventive measure   -DVT proph: pt is ambulating   -no need for chemical AC     Dispo: Patient to remain inpatient for 14 days on active TB tx, thereafter can be discharged to home to f/u as an outpt. Planned d/c on 6/18    Outpatient FU: PMD, ID, Pulm  Clinic appt for [facility] done for 6/21.   Offered to call the family, stated that she will speak to them when she sees them, they come daily 57 y.o. F with hx of HLD and previous alcohol abuse for about 30 yrs (quit 1 yr ago), sent from [facility] clinic due to chronic cough associated with sputum production, positive QuantiFeron test and hx of TB exposure in the past and prior CXR with calcifications/lesions over right upper lobe.     Patient will leave on Monday per Infection control;   they will coordinate with [facility];      1.QuantiFeron positive  -Afebrile, Hemodynamically stable;   -active vs latent infection, continue airborne precautions  -present prior to admission in setting of concurring RUL lesions/ calcifications and hx TB exposure  - Sputum culture x 3 collected, neg sputum AFB  - sp bronch w/ BAL, uncomplicated, AFB smear negative from bronch;    -  BAL culture, so far only H. parafluenzae (likely to represent respiratory negro);  no further findings in BAL Culture'   - empirically on 4 drug TB regimen until culture results are back, if all work up is negative then at that point can convert treatment to Latent TB treatment.   -c/w INH 300mg daily, Rifampin 600mg daily, Pyrazinamide 1500mg daily, Ethambutol 1200mg daily, Vit B6 50mg daily started on 06/05/2017, to complete 14d course while inpatient, last day of admission to be 6/19 unless otherwise contraindicated  - ophthalmology consult appreciated as pt on ethambutol + concerns for dec visual acuity, noted to likely be sec to cataract/chronic visual issues rather than med se  -neuro consult for BL LE neuropathy appreciated, pt denied presence during their eval, increased B6 from 50 to 100mg, cont to watch closely for neuropathy from tx  -watch also for other SE such as hepatitis;  last alt/ast wnl;  6/16/18;     - ID and GUNJAN on board;      2-HLD  -stable, chronic;  last weeks lipid panel wnl    -pt is not taking any medications at this time  -c/w lifestyle modifications diet and exercise   -Low fat diet    3. Allergic Asthma  -stable, uncomplicated, w/o exac  -highly suspected dx given constellation of symptoms present prior to and during admission  -cont oral antihistamines, nasal spray  -duonebs prn inpatient, alb inhaler on dc  -pulm follow up on dc for formal eval    4. Anxiety  -pt with onset during hospitalization sec to feeling "caged in" while on isolation;  Xanax helping with symptoms;     -low dose xanax prn, reassurance provided @ bedside, will follow    5- Preventive measure   -DVT proph: pt is ambulating   -no need for chemical AC     Dispo: Patient to remain inpatient for 14 days on active TB tx, thereafter can be discharged to home to f/u as an outpt. Planned d/c on 6/18    Outpatient FU: PMD, ID, Pulm  Clinic appt for [facility] done for 6/21.   Offered to call the family, stated that she will speak to them when she sees them, they come daily   Plan d/w patient with Monegasque speaking  57 y.o. F with hx of HLD and previous alcohol abuse for about 30 yrs (quit 1 yr ago), sent from [facility] clinic due to chronic cough associated with sputum production, positive QuantiFeron test and hx of TB exposure in the past and prior CXR with calcifications/lesions over right upper lobe.     Patient will leave on Monday per Infection control;   they will coordinate with [facility];  Left message for Infection control Reji who will call back  tomorrow with updates;       1.QuantiFeron positive  -Afebrile, Hemodynamically stable;   -active vs latent infection, continue airborne precautions  -present prior to admission in setting of concurring RUL lesions/ calcifications and hx TB exposure  - Sputum culture x 3 collected, neg sputum AFB  - sp bronch w/ BAL, uncomplicated, AFB smear negative from bronch;    -  BAL culture, so far only H. parafluenzae (likely to represent respiratory negro);  no further findings in BAL Culture'   - empirically on 4 drug TB regimen until culture results are back, if all work up is negative then at that point can convert treatment to Latent TB treatment.   -c/w INH 300mg daily, Rifampin 600mg daily, Pyrazinamide 1500mg daily, Ethambutol 1200mg daily, Vit B6 50mg daily started on 06/05/2017, to complete 14d course while inpatient, last day of admission to be 6/19 unless otherwise contraindicated  - ophthalmology consult appreciated as pt on ethambutol + concerns for dec visual acuity, noted to likely be sec to cataract/chronic visual issues rather than med se  -neuro consult for BL LE neuropathy appreciated, pt denied presence during their eval, increased B6 from 50 to 100mg, cont to watch closely for neuropathy from tx  -watch also for other SE such as hepatitis;  last alt/ast wnl;  6/16/18;     - ID and GUNJAN on board;      2-HLD  -stable, chronic;  last weeks lipid panel wnl    -pt is not taking any medications at this time  -c/w lifestyle modifications diet and exercise   -Low fat diet    3. Allergic Asthma  -stable, uncomplicated, w/o exac  -highly suspected dx given constellation of symptoms present prior to and during admission  -cont oral antihistamines, nasal spray  -duonebs prn inpatient, alb inhaler on dc  -pulm follow up on dc for formal eval    4. Anxiety  -pt with onset during hospitalization sec to feeling "caged in" while on isolation;  Xanax helping with symptoms;     -low dose xanax prn, reassurance provided @ bedside, will follow    5- Preventive measure   -DVT proph: pt is ambulating   -no need for chemical AC     Dispo: Patient to remain inpatient for 14 days on active TB tx, thereafter can be discharged to home to f/u as an outpt. Planned d/c on 6/18    Outpatient FU: PMD, ID, Pulm  Clinic appt for [facility] done for 6/21.   Offered to call the family, stated that she will speak to them when she sees them, they come daily   Plan d/w patient with Latvian speaking

## 2018-06-18 VITALS
RESPIRATION RATE: 18 BRPM | SYSTOLIC BLOOD PRESSURE: 109 MMHG | HEART RATE: 77 BPM | DIASTOLIC BLOOD PRESSURE: 77 MMHG | TEMPERATURE: 98 F | OXYGEN SATURATION: 97 %

## 2018-06-18 LAB
ALBUMIN SERPL ELPH-MCNC: 3.8 G/DL — SIGNIFICANT CHANGE UP (ref 3.3–5.2)
ALP SERPL-CCNC: 129 U/L — HIGH (ref 40–120)
ALT FLD-CCNC: 22 U/L — SIGNIFICANT CHANGE UP
ANION GAP SERPL CALC-SCNC: 13 MMOL/L — SIGNIFICANT CHANGE UP (ref 5–17)
AST SERPL-CCNC: 23 U/L — SIGNIFICANT CHANGE UP
BILIRUB SERPL-MCNC: 0.2 MG/DL — LOW (ref 0.4–2)
BUN SERPL-MCNC: 20 MG/DL — SIGNIFICANT CHANGE UP (ref 8–20)
CALCIUM SERPL-MCNC: 9.3 MG/DL — SIGNIFICANT CHANGE UP (ref 8.6–10.2)
CHLORIDE SERPL-SCNC: 101 MMOL/L — SIGNIFICANT CHANGE UP (ref 98–107)
CO2 SERPL-SCNC: 26 MMOL/L — SIGNIFICANT CHANGE UP (ref 22–29)
CREAT SERPL-MCNC: 0.68 MG/DL — SIGNIFICANT CHANGE UP (ref 0.5–1.3)
GLUCOSE SERPL-MCNC: 86 MG/DL — SIGNIFICANT CHANGE UP (ref 70–115)
POTASSIUM SERPL-MCNC: 3.9 MMOL/L — SIGNIFICANT CHANGE UP (ref 3.5–5.3)
POTASSIUM SERPL-SCNC: 3.9 MMOL/L — SIGNIFICANT CHANGE UP (ref 3.5–5.3)
PROT SERPL-MCNC: 7.5 G/DL — SIGNIFICANT CHANGE UP (ref 6.6–8.7)
SODIUM SERPL-SCNC: 140 MMOL/L — SIGNIFICANT CHANGE UP (ref 135–145)
URATE SERPL-MCNC: 8.2 MG/DL — HIGH (ref 2.4–5.7)

## 2018-06-18 PROCEDURE — 94640 AIRWAY INHALATION TREATMENT: CPT

## 2018-06-18 PROCEDURE — 87116 MYCOBACTERIA CULTURE: CPT

## 2018-06-18 PROCEDURE — 99285 EMERGENCY DEPT VISIT HI MDM: CPT

## 2018-06-18 PROCEDURE — 80074 ACUTE HEPATITIS PANEL: CPT

## 2018-06-18 PROCEDURE — 85027 COMPLETE CBC AUTOMATED: CPT

## 2018-06-18 PROCEDURE — 86850 RBC ANTIBODY SCREEN: CPT

## 2018-06-18 PROCEDURE — 86900 BLOOD TYPING SEROLOGIC ABO: CPT

## 2018-06-18 PROCEDURE — 84100 ASSAY OF PHOSPHORUS: CPT

## 2018-06-18 PROCEDURE — 82962 GLUCOSE BLOOD TEST: CPT

## 2018-06-18 PROCEDURE — 86901 BLOOD TYPING SEROLOGIC RH(D): CPT

## 2018-06-18 PROCEDURE — 85730 THROMBOPLASTIN TIME PARTIAL: CPT

## 2018-06-18 PROCEDURE — 84550 ASSAY OF BLOOD/URIC ACID: CPT

## 2018-06-18 PROCEDURE — 71045 X-RAY EXAM CHEST 1 VIEW: CPT

## 2018-06-18 PROCEDURE — 36415 COLL VENOUS BLD VENIPUNCTURE: CPT

## 2018-06-18 PROCEDURE — 87206 SMEAR FLUORESCENT/ACID STAI: CPT

## 2018-06-18 PROCEDURE — 85610 PROTHROMBIN TIME: CPT

## 2018-06-18 PROCEDURE — 86703 HIV-1/HIV-2 1 RESULT ANTBDY: CPT

## 2018-06-18 PROCEDURE — 83735 ASSAY OF MAGNESIUM: CPT

## 2018-06-18 PROCEDURE — 80053 COMPREHEN METABOLIC PANEL: CPT

## 2018-06-18 PROCEDURE — 87015 SPECIMEN INFECT AGNT CONCNTJ: CPT

## 2018-06-18 PROCEDURE — 99239 HOSP IP/OBS DSCHRG MGMT >30: CPT

## 2018-06-18 PROCEDURE — 86780 TREPONEMA PALLIDUM: CPT

## 2018-06-18 PROCEDURE — 80048 BASIC METABOLIC PNL TOTAL CA: CPT

## 2018-06-18 PROCEDURE — 87102 FUNGUS ISOLATION CULTURE: CPT

## 2018-06-18 PROCEDURE — 87070 CULTURE OTHR SPECIMN AEROBIC: CPT

## 2018-06-18 PROCEDURE — T1013: CPT

## 2018-06-18 PROCEDURE — 71250 CT THORAX DX C-: CPT

## 2018-06-18 RX ORDER — PYRIDOXINE HCL (VITAMIN B6) 100 MG
1 TABLET ORAL
Qty: 30 | Refills: 0 | OUTPATIENT
Start: 2018-06-18 | End: 2018-07-17

## 2018-06-18 RX ORDER — PYRAZINAMIDE 500 MG/1
3 TABLET ORAL
Qty: 9 | Refills: 0 | OUTPATIENT
Start: 2018-06-18 | End: 2018-06-20

## 2018-06-18 RX ORDER — ETHAMBUTOL HYDROCHLORIDE 400 MG/1
3 TABLET, FILM COATED ORAL
Qty: 9 | Refills: 0 | OUTPATIENT
Start: 2018-06-18 | End: 2018-06-20

## 2018-06-18 RX ORDER — HEXAVITAMINS
3 TABLET ORAL
Qty: 9 | Refills: 0 | OUTPATIENT
Start: 2018-06-18 | End: 2018-06-20

## 2018-06-18 RX ORDER — HEXAVITAMINS
1 TABLET ORAL
Qty: 3 | Refills: 0 | OUTPATIENT
Start: 2018-06-18 | End: 2018-06-20

## 2018-06-18 RX ORDER — ALBUTEROL 90 UG/1
2 AEROSOL, METERED ORAL
Qty: 1 | Refills: 0 | OUTPATIENT
Start: 2018-06-18 | End: 2018-07-17

## 2018-06-18 RX ADMIN — LORATADINE 10 MILLIGRAM(S): 10 TABLET ORAL at 11:57

## 2018-06-18 RX ADMIN — ETHAMBUTOL HYDROCHLORIDE 1200 MILLIGRAM(S): 400 TABLET, FILM COATED ORAL at 11:56

## 2018-06-18 RX ADMIN — Medication 100 MILLIGRAM(S): at 11:56

## 2018-06-18 RX ADMIN — Medication 1 SPRAY(S): at 05:19

## 2018-06-18 RX ADMIN — PYRAZINAMIDE 1500 MILLIGRAM(S): 500 TABLET ORAL at 11:57

## 2018-06-18 RX ADMIN — Medication 300 MILLIGRAM(S): at 11:57

## 2018-06-18 RX ADMIN — PANTOPRAZOLE SODIUM 40 MILLIGRAM(S): 20 TABLET, DELAYED RELEASE ORAL at 05:20

## 2018-06-18 NOTE — PROGRESS NOTE ADULT - ATTENDING COMMENTS
Note addended where needed. Plan discussed with patient at bedside  Please see discharge papers for details.

## 2018-06-18 NOTE — PROGRESS NOTE ADULT - ASSESSMENT
57 y.o. F with hx of HLD and previous alcohol abuse for about 30 yrs (quit 1 yr ago), sent from [facility] clinic due to chronic cough associated with sputum production, positive QuantiFeron test and hx of TB exposure in the past and prior CXR with calcifications/lesions over right upper lobe.     Patient will leave on Monday per Infection control;   they will coordinate with [facility];  Left message for Infection control Reji who will call back  tomorrow with updates;       1.QuantiFeron positive  -Afebrile, Hemodynamically stable;   -active vs latent infection, continue airborne precautions  -present prior to admission in setting of concurring RUL lesions/ calcifications and hx TB exposure  - Sputum culture x 3 collected, neg sputum AFB  - sp bronch w/ BAL, uncomplicated, AFB smear negative from bronch;    -  BAL culture, so far only H. parafluenzae (likely to represent respiratory negro);  no further findings in BAL Culture'   - empirically on 4 drug TB regimen until culture results are back, if all work up is negative then at that point can convert treatment to Latent TB treatment.   -c/w INH 300mg daily, Rifampin 600mg daily, Pyrazinamide 1500mg daily, Ethambutol 1200mg daily, Vit B6 50mg daily started on 06/05/2017, to complete 14d course while inpatient, last day of admission to be 6/19 unless otherwise contraindicated  - ophthalmology consult appreciated as pt on ethambutol + concerns for dec visual acuity, noted to likely be sec to cataract/chronic visual issues rather than med se  -neuro consult for BL LE neuropathy appreciated, pt denied presence during their eval, increased B6 from 50 to 100mg, cont to watch closely for neuropathy from tx  -watch also for other SE such as hepatitis;  last alt/ast wnl;  6/16/18;     - ID and GUNJAN on board;      2-HLD  -stable, chronic;  last weeks lipid panel wnl    -pt is not taking any medications at this time  -c/w lifestyle modifications diet and exercise   -Low fat diet    3. Allergic Asthma  -stable, uncomplicated, w/o exac  -highly suspected dx given constellation of symptoms present prior to and during admission  -cont oral antihistamines, nasal spray  -duonebs prn inpatient, alb inhaler on dc  -pulm follow up on dc for formal eval    4. Anxiety  -pt with onset during hospitalization sec to feeling "caged in" while on isolation;  Xanax helping with symptoms;     -low dose xanax prn, reassurance provided @ bedside, will follow    5- Preventive measure   -DVT proph: pt is ambulating   -no need for chemical AC     Dispo: Patient to remain inpatient for 14 days on active TB tx, thereafter can be discharged to home to f/u as an outpt. Planned d/c on 6/18    Outpatient FU: PMD, ID, Pulm  Clinic appt for [facility] done for 6/21.   Offered to call the family, stated that she will speak to them when she sees them, they come daily   Plan d/w patient with Kinyarwanda speaking  Patient is a 57 y.o. F with hx of HLD and previous alcohol abuse for about 30 yrs (quit 1 yr ago), sent from [facility] clinic due to chronic cough associated with sputum production, positive QuantiFeron test and hx of TB exposure in the past and prior CXR with calcifications/lesions over right upper lobe.     Patient will leave today per Infection control;   they will coordinate with [facility].    1.QuantiFeron positive  -Afebrile, Hemodynamically stable;   -active vs latent infection, continue airborne precautions  - Sputum culture x 3 collected, neg sputum AFB  - sp bronch w/ BAL, uncomplicated, AFB smear negative from bronch;    -  BAL culture, so far only H. parafluenzae (likely to represent respiratory negro);  no further findings in BAL Culture'   - empirically on 4 drug TB regimen until culture results are back, if all work up is negative then at that point can convert treatment to Latent TB treatment.   -c/w INH 300mg daily, Rifampin 600mg daily, Pyrazinamide 1500mg daily, Ethambutol 1200mg daily, Vit B6 50mg daily started on 06/05/2017, to complete 14d course while inpatient, last day of admission to be 6/19 unless otherwise contraindicated  - ophthalmology consult appreciated as pt on ethambutol + concerns for dec visual acuity, noted to likely be sec to cataract/chronic visual issues rather than med se  -neuro consult for BL LE neuropathy appreciated, pt denied presence during their eval, increased B6 from 50 to 100mg, cont to watch closely for neuropathy from tx  -watch also for other SE such as hepatitis;  last alt/ast wnl;  6/16/18;     - ID and GUNJAN on board;  uric acid level went up.     2-HLD  -stable, chronic;  last weeks lipid panel wnl    -pt is not taking any medications at this time  -c/w lifestyle modifications diet and exercise, Low fat diet    3. Allergic Asthma  -stable, uncomplicated, w/o exac  -highly suspected dx given constellation of symptoms present prior to and during admission  -cont oral antihistamines, nasal spray  -duonebs prn inpatient, alb inhaler on dc  -pulm follow up on dc for formal eval    4. Anxiety  -pt with onset during hospitalization sec to feeling "caged in" while on isolation;  Xanax helping with symptoms;     -low dose xanax prn, reassurance provided @ bedside, will follow    5- Preventive measure   -DVT proph: pt is ambulating   -no need for chemical AC     Dispo: Patient to remain inpatient for 14 days on active TB tx, thereafter can be discharged to home to f/u as an outpt. Planned d/c on 6/18    Outpatient FU: PMD, ID, Pulm  Clinic appt for [facility] done for 6/21.   Offered to call the family, stated that she will speak to them when she sees them, they come daily   Plan d/w patient with Honduran speaking resident MD

## 2018-06-18 NOTE — CHART NOTE - NSCHARTNOTEFT_GEN_A_CORE
Source: Patient [ ]  Family [ ]   other [x ]    Current Diet:  dash    Patient reports [ ] nausea  [ ] vomiting [ ] diarrhea [ ] constipation  [ ]chewing problems [ ] swallowing issues  [ ] other:     PO intake:  < 50% [ ]   50-75%  [ ]   %  [x ]  other :    Source for PO intake [ ] Patient [ ] family x[ ] chart [ ] staff [ ] other    Enteral /Parenteral Nutrition:     Current Weight:  5/29129.8#, 6/5 131.7#, 6/13 142.7#  no weight loss noted    % Weight Change     Pertinent Medications: MEDICATIONS  (STANDING):  ethambutol 1200 milliGRAM(s) Oral daily  fluticasone propionate 50 MICROgram(s)/spray Nasal Spray 1 Spray(s) Both Nostrils two times a day  isoniazid 300 milliGRAM(s) Oral daily  loratadine 10 milliGRAM(s) Oral daily  pantoprazole    Tablet 40 milliGRAM(s) Oral before breakfast  pyrazinamide 1500 milliGRAM(s) Oral daily  pyridoxine 100 milliGRAM(s) Oral daily  rifampin 600 milliGRAM(s) Oral daily    MEDICATIONS  (PRN):  acetaminophen   Tablet. 650 milliGRAM(s) Oral every 6 hours PRN Mild Pain (1 - 3)  ALBUTerol/ipratropium for Nebulization 3 milliLiter(s) Nebulizer every 6 hours PRN Shortness of Breath and/or Wheezing  ALPRAZolam 0.25 milliGRAM(s) Oral two times a day PRN amxiety/depression    Pertinent Labs: 06-18 Na140 mmol/L Glu 86 mg/dL K+ 3.9 mmol/L Cr  0.68 mg/dL BUN 20.0 mg/dL Phos n/a   Alb 3.8 g/dL PAB n/a               Skin:     Nutrition focused physical exam not conducted - found signs of malnutrition [ ]absent [ ]present    Subcutaneous fat loss: [ ] Orbital fat pads region, [ ]Buccal fat region, [ ]Triceps region,  [ ]Ribs region    Muscle wasting: [ ]Temples region, [ ]Clavicle region, [ ]Shoulder region, [ ]Scapula region, [ ]Interosseous region,  [ ]thigh region, [ ]Calf region    Estimated Needs:   [x ] no change since previous assessment  [ ] recalculated:     Current Nutrition Diagnosis:  Pt's continues to eat 100% at meals, which is confirmed by RN flow sheets, tolerating well. No nutrition issues noted.          Recommendations: Continue to tolerate meals with good po intake.    Monitoring and Evaluation:   [x ] PO intake [x ] Tolerance to diet prescription [X] Weights  [X] Follow up per protocol [X] Labs:

## 2018-06-18 NOTE — PROGRESS NOTE ADULT - PROVIDER SPECIALTY LIST ADULT
Anesthesia
Family Medicine
Infectious Disease
Anesthesia
Infectious Disease
Family Medicine
Family Medicine

## 2018-06-18 NOTE — PROGRESS NOTE ADULT - SUBJECTIVE AND OBJECTIVE BOX
CC: Patient is a 57y old  Female who presented with a chief complaint of cough, positive QuantiFeron test and abnormal cxr, admitted to rule out active tuberculosis (02 Jun 2018 16:32)    Patient seen and examined at bedside. Currently with no complaints. She is tolerating PO intake, voiding and stooling.  ROS: Denies fever, chest pain, SOB, abdominal pain, diarrhea, constipation, isolated calf pain. All rest of ROs negative     Vital Signs Last 24 Hrs  T(C): 36.9 (18 Jun 2018 06:28), Max: 36.9 (18 Jun 2018 06:28)  T(F): 98.4 (18 Jun 2018 06:28), Max: 98.4 (18 Jun 2018 06:28)  HR: 58 (18 Jun 2018 06:28) (58 - 70)  BP: 124/78 (18 Jun 2018 06:28) (121/77 - 124/78)  BP(mean): --  RR: 18 (18 Jun 2018 06:28) (16 - 18)  SpO2: 98% (18 Jun 2018 06:28) (98% - 98%)    Physical Exam:   Gen: No acute distress.  HEENT: Normocephalic, atraumatic, EOMI, PERRL.  CVS: RRR, +S1/S2, no murmurs, rubs or gallops appreciated  Lungs: Normal respiratory rate and effort, no wheeze, rales, rhonchi  Abdomen: Normal bowel sounds, soft, nontender, nondistended. No palpable flank tenderness or mass, no CVA tenderness  Ext: No cyanosis, edema or calf tenderness  Neuro: AAOx3, no focal deficits.      MEDICATIONS  (STANDING):  ethambutol 1200 milliGRAM(s) Oral daily  fluticasone propionate 50 MICROgram(s)/spray Nasal Spray 1 Spray(s) Both Nostrils two times a day  isoniazid 300 milliGRAM(s) Oral daily  loratadine 10 milliGRAM(s) Oral daily  pantoprazole    Tablet 40 milliGRAM(s) Oral before breakfast  pyrazinamide 1500 milliGRAM(s) Oral daily  pyridoxine 100 milliGRAM(s) Oral daily  rifampin 600 milliGRAM(s) Oral daily    MEDICATIONS  (PRN):  acetaminophen   Tablet. 650 milliGRAM(s) Oral every 6 hours PRN Mild Pain (1 - 3)  ALBUTerol/ipratropium for Nebulization 3 milliLiter(s) Nebulizer every 6 hours PRN Shortness of Breath and/or Wheezing  ALPRAZolam 0.25 milliGRAM(s) Oral two times a day PRN amxiety/depression CC: Patient is a 57y old  Female who presented with a chief complaint of cough, positive QuantiFeron test and abnormal cxr, admitted to rule out active tuberculosis (02 Jun 2018 16:32)    Patient seen and examined at bedside. Currently with no complaints. She is tolerating PO intake, voiding and stooling.  ROS: Denies fever, chest pain, SOB, abdominal pain, diarrhea, constipation, isolated calf pain. All rest of ROs negative     Vital Signs Last 24 Hrs  T(C): 36.9 (18 Jun 2018 06:28), Max: 36.9 (18 Jun 2018 06:28)  T(F): 98.4 (18 Jun 2018 06:28), Max: 98.4 (18 Jun 2018 06:28)  HR: 58 (18 Jun 2018 06:28) (58 - 70)  BP: 124/78 (18 Jun 2018 06:28) (121/77 - 124/78)  RR: 18 (18 Jun 2018 06:28) (16 - 18)  SpO2: 98% (18 Jun 2018 06:28) (98% - 98%)    Physical Exam:   Gen: No acute distress.  HEENT: Normocephalic, atraumatic, EOMI, PERRL.  CVS: RRR, +S1/S2, no murmurs, rubs or gallops appreciated  Lungs: Normal respiratory rate and effort, no wheeze, rales, rhonchi  Abdomen: Normal bowel sounds, soft, nontender, nondistended. No palpable flank tenderness or mass, no CVA tenderness  Ext: No cyanosis, edema or calf tenderness  Neuro: AAOx3, no focal deficits.    Labs:   06-18    140  |  101  |  20.0  ----------------------------<  86  3.9   |  26.0  |  0.68    Ca    9.3      18 Jun 2018 07:24    TPro  7.5  /  Alb  3.8  /  TBili  0.2<L>  /  DBili  x   /  AST  23  /  ALT  22  /  AlkPhos  129<H>  06-18      MEDICATIONS  (STANDING):  ethambutol 1200 milliGRAM(s) Oral daily  fluticasone propionate 50 MICROgram(s)/spray Nasal Spray 1 Spray(s) Both Nostrils two times a day  isoniazid 300 milliGRAM(s) Oral daily  loratadine 10 milliGRAM(s) Oral daily  pantoprazole    Tablet 40 milliGRAM(s) Oral before breakfast  pyrazinamide 1500 milliGRAM(s) Oral daily  pyridoxine 100 milliGRAM(s) Oral daily  rifampin 600 milliGRAM(s) Oral daily    MEDICATIONS  (PRN):  acetaminophen   Tablet. 650 milliGRAM(s) Oral every 6 hours PRN Mild Pain (1 - 3)  ALBUTerol/ipratropium for Nebulization 3 milliLiter(s) Nebulizer every 6 hours PRN Shortness of Breath and/or Wheezing  ALPRAZolam 0.25 milliGRAM(s) Oral two times a day PRN amxiety/depression

## 2018-06-30 LAB
CULTURE RESULTS: SIGNIFICANT CHANGE UP
SPECIMEN SOURCE: SIGNIFICANT CHANGE UP

## 2018-07-19 ENCOUNTER — EMERGENCY (EMERGENCY)
Facility: HOSPITAL | Age: 58
LOS: 1 days | Discharge: HOME | End: 2018-07-19
Attending: EMERGENCY MEDICINE
Payer: MEDICAID

## 2018-07-19 VITALS
HEIGHT: 59 IN | TEMPERATURE: 99 F | RESPIRATION RATE: 18 BRPM | WEIGHT: 125 LBS | OXYGEN SATURATION: 95 % | SYSTOLIC BLOOD PRESSURE: 153 MMHG | HEART RATE: 69 BPM | DIASTOLIC BLOOD PRESSURE: 86 MMHG

## 2018-07-19 VITALS
OXYGEN SATURATION: 98 % | HEART RATE: 68 BPM | TEMPERATURE: 98 F | SYSTOLIC BLOOD PRESSURE: 138 MMHG | DIASTOLIC BLOOD PRESSURE: 72 MMHG | RESPIRATION RATE: 19 BRPM

## 2018-07-19 DIAGNOSIS — Z98.890 OTHER SPECIFIED POSTPROCEDURAL STATES: Chronic | ICD-10-CM

## 2018-07-19 LAB
ALBUMIN SERPL ELPH-MCNC: 4.2 G/DL — SIGNIFICANT CHANGE UP (ref 3.3–5.2)
ALP SERPL-CCNC: 151 U/L — HIGH (ref 40–120)
ALT FLD-CCNC: 21 U/L — SIGNIFICANT CHANGE UP
ANION GAP SERPL CALC-SCNC: 15 MMOL/L — SIGNIFICANT CHANGE UP (ref 5–17)
AST SERPL-CCNC: 32 U/L — HIGH
BASOPHILS # BLD AUTO: 0 K/UL — SIGNIFICANT CHANGE UP (ref 0–0.2)
BASOPHILS NFR BLD AUTO: 0.4 % — SIGNIFICANT CHANGE UP (ref 0–2)
BILIRUB SERPL-MCNC: <0.2 MG/DL — LOW (ref 0.4–2)
BUN SERPL-MCNC: 13 MG/DL — SIGNIFICANT CHANGE UP (ref 8–20)
CALCIUM SERPL-MCNC: 9.7 MG/DL — SIGNIFICANT CHANGE UP (ref 8.6–10.2)
CHLORIDE SERPL-SCNC: 101 MMOL/L — SIGNIFICANT CHANGE UP (ref 98–107)
CO2 SERPL-SCNC: 25 MMOL/L — SIGNIFICANT CHANGE UP (ref 22–29)
CREAT SERPL-MCNC: 0.52 MG/DL — SIGNIFICANT CHANGE UP (ref 0.5–1.3)
EOSINOPHIL # BLD AUTO: 0.7 K/UL — HIGH (ref 0–0.5)
EOSINOPHIL NFR BLD AUTO: 10.3 % — HIGH (ref 0–6)
GLUCOSE SERPL-MCNC: 99 MG/DL — SIGNIFICANT CHANGE UP (ref 70–115)
HCT VFR BLD CALC: 39.3 % — SIGNIFICANT CHANGE UP (ref 37–47)
HGB BLD-MCNC: 12.5 G/DL — SIGNIFICANT CHANGE UP (ref 12–16)
LYMPHOCYTES # BLD AUTO: 2.1 K/UL — SIGNIFICANT CHANGE UP (ref 1–4.8)
LYMPHOCYTES # BLD AUTO: 30.5 % — SIGNIFICANT CHANGE UP (ref 20–55)
MCHC RBC-ENTMCNC: 28.7 PG — SIGNIFICANT CHANGE UP (ref 27–31)
MCHC RBC-ENTMCNC: 31.8 G/DL — LOW (ref 32–36)
MCV RBC AUTO: 90.1 FL — SIGNIFICANT CHANGE UP (ref 81–99)
MONOCYTES # BLD AUTO: 0.4 K/UL — SIGNIFICANT CHANGE UP (ref 0–0.8)
MONOCYTES NFR BLD AUTO: 5.6 % — SIGNIFICANT CHANGE UP (ref 3–10)
NEUTROPHILS # BLD AUTO: 3.6 K/UL — SIGNIFICANT CHANGE UP (ref 1.8–8)
NEUTROPHILS NFR BLD AUTO: 52.9 % — SIGNIFICANT CHANGE UP (ref 37–73)
PLATELET # BLD AUTO: 275 K/UL — SIGNIFICANT CHANGE UP (ref 150–400)
POTASSIUM SERPL-MCNC: 4 MMOL/L — SIGNIFICANT CHANGE UP (ref 3.5–5.3)
POTASSIUM SERPL-SCNC: 4 MMOL/L — SIGNIFICANT CHANGE UP (ref 3.5–5.3)
PROT SERPL-MCNC: 7.9 G/DL — SIGNIFICANT CHANGE UP (ref 6.6–8.7)
RAPID RVP RESULT: SIGNIFICANT CHANGE UP
RBC # BLD: 4.36 M/UL — LOW (ref 4.4–5.2)
RBC # FLD: 14.1 % — SIGNIFICANT CHANGE UP (ref 11–15.6)
SODIUM SERPL-SCNC: 141 MMOL/L — SIGNIFICANT CHANGE UP (ref 135–145)
WBC # BLD: 6.8 K/UL — SIGNIFICANT CHANGE UP (ref 4.8–10.8)
WBC # FLD AUTO: 6.8 K/UL — SIGNIFICANT CHANGE UP (ref 4.8–10.8)

## 2018-07-19 PROCEDURE — 94640 AIRWAY INHALATION TREATMENT: CPT

## 2018-07-19 PROCEDURE — 93010 ELECTROCARDIOGRAM REPORT: CPT

## 2018-07-19 PROCEDURE — 71045 X-RAY EXAM CHEST 1 VIEW: CPT

## 2018-07-19 PROCEDURE — T1013: CPT

## 2018-07-19 PROCEDURE — 93005 ELECTROCARDIOGRAM TRACING: CPT

## 2018-07-19 PROCEDURE — 85027 COMPLETE CBC AUTOMATED: CPT

## 2018-07-19 PROCEDURE — 80053 COMPREHEN METABOLIC PANEL: CPT

## 2018-07-19 PROCEDURE — 87633 RESP VIRUS 12-25 TARGETS: CPT

## 2018-07-19 PROCEDURE — 71045 X-RAY EXAM CHEST 1 VIEW: CPT | Mod: 26

## 2018-07-19 PROCEDURE — 36415 COLL VENOUS BLD VENIPUNCTURE: CPT

## 2018-07-19 PROCEDURE — 87581 M.PNEUMON DNA AMP PROBE: CPT

## 2018-07-19 PROCEDURE — 84484 ASSAY OF TROPONIN QUANT: CPT

## 2018-07-19 PROCEDURE — 87486 CHLMYD PNEUM DNA AMP PROBE: CPT

## 2018-07-19 PROCEDURE — 87798 DETECT AGENT NOS DNA AMP: CPT

## 2018-07-19 PROCEDURE — 99285 EMERGENCY DEPT VISIT HI MDM: CPT

## 2018-07-19 PROCEDURE — 99283 EMERGENCY DEPT VISIT LOW MDM: CPT | Mod: 25

## 2018-07-19 RX ORDER — IPRATROPIUM/ALBUTEROL SULFATE 18-103MCG
3 AEROSOL WITH ADAPTER (GRAM) INHALATION ONCE
Qty: 0 | Refills: 0 | Status: COMPLETED | OUTPATIENT
Start: 2018-07-19 | End: 2018-07-19

## 2018-07-19 RX ADMIN — Medication 3 MILLILITER(S): at 17:48

## 2018-07-19 NOTE — ED ADULT TRIAGE NOTE - CHIEF COMPLAINT QUOTE
Patient arrived via EMS, awake, alert, and oriented times 3, breathing unlabored.  Patient complaining of productive cough with yellow sputum, body aches, and right arm pain.  patient on TB regimen which started June 5th .  patient placed in isolation room.  Patient compliant  with TB meds

## 2018-07-19 NOTE — ED ADULT NURSE NOTE - OBJECTIVE STATEMENT
Patient A&OX3, c/o productive cough with yellow sputum and body aches. breathing unlabored. Patient on TB regimen which started June 5th.  Patient compliant  with TB meds. VSS.

## 2018-07-19 NOTE — ED ADULT NURSE REASSESSMENT NOTE - NS ED NURSE REASSESS COMMENT FT1
Assumed patient care from CHELY Silva at 1930, charting as noted. Received patient lying in bed, a&ox3, able to make needs known. Patient remains on airborne isolation re: on TB regimen. Patient nsr on cm, denies any pain or discomfort at this time. Patient noted with left ac iv patent, iv site without redness or swelling. Plan of care and wait time explained, patient verbalized understanding. Patient  not in respiratory distress. To continue to monitor.

## 2018-07-19 NOTE — ED PROVIDER NOTE - MEDICAL DECISION MAKING DETAILS
Pt presents with persistent cough s/p discharge from hospital. Pt continues on TB regimen but no definitive TB seen on testing.  c/o MSK R arm pain w/ coughing; EKG reviewed and not indicative of acute ischemia.  Plan to check troponin, labs, CXR, treat with duoneb, and reevaluate.

## 2018-07-19 NOTE — ED PROVIDER NOTE - NS_EDPROVIDERDISPOUSERTYPE_ED_A_ED
Attending Attestation (For Attendings USE Only)... Scribe Attestation (For Scribes USE Only)... Scribe Attestation (For Scribes USE Only).../Attending Attestation (For Attendings USE Only)...

## 2018-07-19 NOTE — ED PROVIDER NOTE - OBJECTIVE STATEMENT
58 y/o F with hx of alcohol abuse and HLD presents to the ED with a cough. Pt reports she came here from [facility] on May 6. She 56 y/o F with hx of alcohol abuse and HLD presents to the ED with a cough. Pt reports she came here from [facility] on May 6 with a cough and was admitted for workup of TB. Pt states she was here for 3 weeks and discharged to home recently. She says cough has persisted and now she is complaining of feeling hot intermittently.  She notes sputums is sometimes clear, sometimes yellow,. PT reports some R arm pain but only with coughing, and not otherwise; denies any chest pain. Denies SOB/ leg swelling. Pt says she hasn't followed up with any doctor since discharge because she has no PCP. The nurse who went to her house today to give her medications recommended she come in if she wasn't feeling well

## 2018-07-19 NOTE — ED PROVIDER NOTE - PROGRESS NOTE DETAILS
Case d/w Dr. Lu from ID who said that pt would no longer require isolation. I reviewed patient's results with patient and allowed the opportunity for questions.  I provided the patient with anticipatory guidance, advised followup with PCP, and gave return precautions. Patient reported that they were feeling well for discharge and expressed understanding of instructions.  Patient is well for discharge.

## 2018-07-21 LAB
CULTURE RESULTS: SIGNIFICANT CHANGE UP
SPECIMEN SOURCE: SIGNIFICANT CHANGE UP

## 2018-07-25 LAB
CULTURE RESULTS: SIGNIFICANT CHANGE UP
SPECIMEN SOURCE: SIGNIFICANT CHANGE UP

## 2018-07-28 LAB
CULTURE RESULTS: SIGNIFICANT CHANGE UP
SPECIMEN SOURCE: SIGNIFICANT CHANGE UP

## 2020-01-05 NOTE — H&P ADULT - NSHPPOAPRESSUREULCER_GEN_ALL_CORE
General


Chief Complaint:  General Problems/Pain


Stated Complaint:  PNEUMONIA SYMPTOMS





History of Present Illness


Date Seen by Provider:  2020


Time Seen by Provider:  14:00


Initial Comments


56 yo female


recently in hospital with pneumonia  in fact discharged 2 d ago


Rx'd antibiotics   not taking she says because developed diarrhea


hx of IV drugs advanced liver disease hep C


presents with cough VS stable O2 sat 100%


apparently male friend took BP at home 117/  felt that was worrisome, and this 

precipitated visit





Allergies and Home Medications


Allergies


Coded Allergies:  


     hydrocodone (Verified  Allergy, Unknown, 1/10/19)


     hydrochlorothiazide (Verified  Adverse Reaction, Unknown, DIARRHEA, 

19)





Home Medications


Albuterol Sulfate 2.5 Mg/3 Ml Vial.neb, 2.5 MG NEB Q4H PRN for SHORTNESS OF TRISTAN

TH, (Reported)


Aspirin 81 Mg Tablet.dr, 81 MG PO DAILY, (Reported)


   LAST FILLED #30 11-15-19 


Cefdinir 300 Mg Capsule, 300 MG PO BID


   Prescribed by: SKY JONES on 1/3/20 1112


Celecoxib 200 Mg Capsule, 200 MG PO DAILY, (Reported)


   LAST FILLED #30 19 


Folic Acid 1 Mg Tablet, 1 MG PO DAILY, (Reported)


   LAST FILLED #30 19 


Gabapentin 100 Mg Capsule, 100 MG PO BID, (Reported)


   LAST FILLED #60 10-07-19 


Hydrochlorothiazide 25 Mg Tablet, 25 MG PO DAILY, (Reported)


   LAST FILLED #30 19 


Losartan Potassium 50 Mg Tablet, 50 MG PO DAILY, (Reported)


   LAST FILLED #30 19 


Potassium Chloride 10 Meq Tab.er.prt, 10 MEQ PO DAILY, (Reported)


   LAST FILLED #30 19 





Patient Home Medication List


Home Medication List Reviewed:  Yes





Past Medical-Social-Family Hx


Patient Social History


Drug of Choice:  + IV METH USE


Type Used:  Cigarettes


2nd Hand Smoke Exposure:  Yes


Recent Foreign Travel:  No


Contact w/Someone Who Travel:  No


Recent Hopitalizations:  No





Immunizations Up To Date


Tetanus Booster (TDap):  More than 5yrs


Date of Influenza Vaccine:  Oct 1, 2019





Seasonal Allergies


Seasonal Allergies:  No





Past Medical History


Surgeries:  Yes (LITHOTRIPSY; FACIAL RECONSTRUCTION;  X 1-TWINS)


 Section, Renal, Tubal Ligation


Respiratory:  No


Cardiac:  Yes (VASCULITIS OF LEGS)


Chronic Edema/Swelling, Hypertension, Peripheral Vascular


Neurological:  Yes


Neuropathy


GYN History:  Menopausal


Genitourinary:  Yes


Kidney Stones


Gastrointestinal:  Yes (HEPATITIS C--NO TREATMENT)


Hepatitis, Cirrhosis


Musculoskeletal:  Yes


Arthritis, Rheumatoid Arthritis


Endocrine:  Yes


Diabetes, Non-Insulin dep


HEENT:  No


Cancer:  No


Psychosocial:  Yes


Anxiety


Integumentary:  Yes (CHRONIC LEG EDEMA AND CELLULITIS ; VASCULITIS)


Blood Disorders:  No





Family Medical History


Heart Disease, Cancer, Diabetes





Physical Exam


Vital Signs


Capillary Refill :


Height, Weight, BMI


Height: 5'3.00"


Weight: 159lbs. 0oz. 72.652936ct; 27.16 BMI


Method:Stated





Progress/Results/Core Measures


Suspected Sepsis


SIRS


Temperature: 


Pulse:  


Respiratory Rate: 


 


Blood Pressure  / 


Mean:





Results/Orders


My Orders





Orders - MATTHEW OVIEDO MD


Chest Pa/Lat (2 View) (20 13:12)


Blood Culture (20 13:12)


Lactic Acid Analyzer (20 13:12)


Iv Heplock-Insert (Order) (20 13:12)


Cbc With Automated Diff (20 13:12)


Comprehensive Metabolic Panel (20 13:12)





Vital Signs/I&O


Capillary Refill :





Departure


Impression


Condition:  Against Medical Advice (pt refused further attempted blood draws or 

IV starts and decided to sign out AMA  only CXR done which showed mild 

improvement from recent)





Departure-Patient Inst.


Referrals:  


[facility] (PCP/Family)


Primary Care Physician











MATTHEW OVIEDO MD               2020 14:06
no

## 2020-06-30 NOTE — DISCHARGE NOTE ADULT - NS AS DC FOLLOWUP STROKE INST
CHIEF COMPLAINT:   Chief Complaint   Patient presents with   • Gyn Exam     denies c/o        HPI: Here for annual.  No concerns.  Not sexually active    Exercise discussed    Diet discussed    Depression Screening:  PHQ2/9:               ROS:   General/Constitutional Denies.    Urology Denies.   Integumentary Denies.   Women Only Denies.    Genitourinary Denies.    Skin Denies.    Gastrointestinal Denies.    Endocrine Denies.    Psychiatric Denies.    Health Education Admits.         OB/GYN HISTORY:  OB History    Para Term  AB Living   0 0 0 0 0 0   SAB TAB Ectopic Molar Multiple Live Births   0 0 0 0 0 0       MENSTRUAL HISTORY :  No LMP recorded.        SEXUAL ACTIVITY:  Social History     Substance and Sexual Activity   Sexual Activity Not Currently       HISTORY:  Past Medical History:   Diagnosis Date   • Abdominal migraine    • Depressive disorder    • History of mammogram 2018    WBUS   • Hypothyroidism       Past Surgical History:   Procedure Laterality Date   • Colonoscopy  2013    negative   • Colposcopy  2006    labial cyst   • Dexa bone density axial skeleton  2018   • Lipoma resection       Current Outpatient Medications   Medication Sig Dispense Refill   • FLUoxetine (PROZAC) 20 MG capsule TAKE 1 CAPSULE BY MOUTH EVERY DAY 30 capsule 0   • levothyroxine 25 MCG tablet Take 1.5 tablets by mouth daily. 135 tablet 3   • SUMAtriptan (IMITREX) 50 MG tablet Take 1 tablet by mouth as needed for Migraine. Take 1 tablet by mouth at onset of migraine. May repeat after 2 hours if needed. 12 tablet 11     No current facility-administered medications for this visit.      ALLERGIES:  No Known Allergies  Family History   Problem Relation Age of Onset   • Hypertension Mother    • Heart disease Mother    • Congestive Heart Failure Mother    • Cancer Father         bladder   • Cancer, Breast Sister      Social History     Tobacco Use   • Smoking status: Never Smoker   • Smokeless  tobacco: Never Used   Substance Use Topics   • Alcohol use: Never     Frequency: Never   • Drug use: Never      The following EMR sections were reviewed and updated during today's visit: Allergies, Medication List, Problem List, Past Medical History, Past Surgical History, Social History and Family History    PHYSICAL EXAM:  Visit Vitals  /74 (BP Location: RUE - Right upper extremity, Patient Position: Sitting, Cuff Size: Regular)   Pulse 60   Temp 97.8 °F (36.6 °C) (Oral)   Resp 16   Ht 5' 7\" (1.702 m)   Wt 74.7 kg (164 lb 9.6 oz)   BMI 25.78 kg/m²     Vital signs reviewed for today's visit.  GENERAL APEARANCE:  The patient is a pleasant, normal appearing female with normal affect and in no distress.  BREASTS: Breast exam performed seated and supine.  No masses, non-tender, no nipple discharge or lymphadenopathy.  ABDOMEN: Soft, non-tender, non-distended.  No hepatosplenomegaly.  Normal bowel sounds.  No umbilical or inguinal hernias.  SKIN:  Warm and dry to touch.  No lesions or rashes noted.    PSYCHIATRIC/NEUROLOGIC:  Appropriate mood and affect, normal recall, alert and oriented x 3  EXTREMITIES:  Warm and well perfused.  No edema noted.  Muscle strength and sensation are normal bilaterally 5/5 in both upper and lower extremities.     :  Vulva: Inspection of her external genitalia reveals normal mons pubis, labia minora and labia majora.  Normal appearing     Clitoris, urethral meatus and [place]'s glands.    Bladder:  No evidence of urethral or bladder tenderness.    Vagina:  Speculum exam reveals pink and dry vaginal mucosa.  Bartholin gland is normal to palpation. Use small speculum  Cervix:  Cervix is normal in appearance with no lesions.  There is no cervical motion tenderness.   Uterus:  Uterus is mobile, anteverted and non-tender,  No adnexal masses are palpated.  Adnexa are non-tender to palpation  Perineum:  Perineum appears normal. No lesions or masses.  Rectal Exam: not  done    ASSESSMENT:  Problem List Items Addressed This Visit     None      Visit Diagnoses     Gynecologic exam normal    -  Primary    Screening for breast cancer        Relevant Orders    MAMMO SCREENING BILATERAL            PLAN:   Sbe, ca++, Vit d,Exercise    Return in about 1 year (around 6/30/2021) for Annual Exam.      Oriana Hartman MD  6/30/2020   Smoking Cessation

## 2021-07-07 NOTE — PROGRESS NOTE ADULT - SUBJECTIVE AND OBJECTIVE BOX
[facility]  INFECTIOUS DISEASES AND INTERNAL MEDICINE at [place]  =======================================================  Ha Callahan MD  Diplomates American Board of Internal Medicine and Infectious Diseases  =======================================================    ERIC DONOVAN [id number]    Follow up:    56 y/o woman with PMH of HLD and alcohol abuse in the past was referred by [facility] clinic for possible TB infection. She has cough with yellow/green sputum, night sweats and weight loss in last 2 years.    She lives in [place] and vising her kids in [place]. [place] to [place] on [place]/4 and planning to go back in 1-2 months. 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 Never had PPD test in the past.   Three of her family members tested positives with QuantiFeron test and they were already treated for 9motnhs.   No new complaint today when I saw her this morning. S/P bronchoscopy , smear neg but cultures pending.    PAST MEDICAL & SURGICAL HISTORY:  Hyperlipidemia  Alcohol abuse stopped one year ago  S/P hernia repair    Social Hx: No smoking or drugs but abused alcohol for more than 20 years and stopped one year ago.     FAMILY HISTORY:  No pertinent family history in first degree relatives    Allergies  No Known Allergies    Antibiotics:  None      REVIEW OF SYSTEMS:  CONSTITUTIONAL:  No Fever or chills, + weight loss   HEENT:  No diplopia or blurred vision.  No earache, sore throat, + Runny nose  CARDIOVASCULAR:  No chest pain or SOB.  RESPIRATORY:  + cough, No SOB, +sputum   GI:  No nausea, vomiting or diarrhea.  GENITOURINARY:  No dysuria, frequency or urgency. No Blood in urine  MUSCULOSKELETAL:  no joint aches, no muscle pain  SKIN:  No change in skin, hair or nails.  NEUROLOGIC:  No paresthesias, fasciculations, seizures or weakness.  PSYCHIATRIC:  No disorder of thought or mood.  ENDOCRINE:  No heat or cold intolerance, polyuria or polydipsia.  HEMATOLOGICAL:  No easy bruising or bleeding.     Physical Exam:  Vital Signs Last 24 Hrs  T(C): 36.9 (07 Jun 2018 11:49), Max: 36.9 (07 Jun 2018 11:49)  T(F): 98.5 (07 Jun 2018 11:49), Max: 98.5 (07 Jun 2018 11:49)  HR: 77 (07 Jun 2018 11:49) (68 - 77)  BP: 116/86 (07 Jun 2018 11:49) (116/86 - 139/78)  RR: 18 (07 Jun 2018 11:49) (18 - 20)  SpO2: 100% (07 Jun 2018 04:55) (100% - 100%)  HEENT: normocephalic and atraumatic. EOMI. PERRL.    NECK: Supple.  No lymphadenopathy   LUNGS: scattered coarse rhonchi bilaterally  HEART: Regular rate and rhythm without murmur.  ABDOMEN: Soft, nontender, and nondistended.  Positive bowel sounds.    : No CVA tenderness  EXTREMITIES: Without any cyanosis, clubbing, rash, lesions or edema.   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  PSYCHIATRIC: Appropriate affect .  SKIN: no lesion or rash       Labs:  06-06    140  |  102  |  24.0<H>  ----------------------------<  90  4.0   |  26.0  |  0.63    Ca    9.1      06 Jun 2018 07:09                          12.0   6.7   )-----------( 204      ( 06 Jun 2018 07:09 )             38.5       RECENT CULTURES:  06-06 @ 01:44 .Broncial       06-05 @ 14:55 .Broncial Bronchial Lavage     Numerous Haemophilus parainfluenzae  Beta lactamase negative  Rare Routine respiratory negro present    Few White blood cells  No organisms seen    05-31 @ 18:28 .Sputum Sputum       05-31 @ 00:46 .Sputum Sputum       05-30 @ 21:31 .Sputum Sputum       All imaging and data are reviewed. Thalidomide Counseling: I discussed with the patient the risks of thalidomide including but not limited to birth defects, anxiety, weakness, chest pain, dizziness, cough and severe allergy.

## 2024-12-06 NOTE — PATIENT PROFILE ADULT. - MEDICATION ADMINISTRATION INFO, PROFILE
[facility] has been ordered for you on discharge. They will call you to schedule the initial visit, however if you have questions about their services, their phone number is: [phone].     Follow up with the [employer] [facility] in [place] (with [facility]) to inquire about homemaker services they may be able to provide for housework, meal prep, etc. assistance.  Phone #: [phone]    See [facility] brochure provided to you for private caregiver services. You can call their main office at [phone] to set up a free in-home assessment if you need additional support at home.        Your Care Transitions Nurse is Hayde Lewis RN and can be directly reached at [phone].     
no concerns